# Patient Record
Sex: FEMALE | Race: WHITE | NOT HISPANIC OR LATINO | Employment: FULL TIME | URBAN - METROPOLITAN AREA
[De-identification: names, ages, dates, MRNs, and addresses within clinical notes are randomized per-mention and may not be internally consistent; named-entity substitution may affect disease eponyms.]

---

## 2017-09-16 ENCOUNTER — HOSPITAL ENCOUNTER (OUTPATIENT)
Facility: MEDICAL CENTER | Age: 42
End: 2017-09-16
Payer: COMMERCIAL

## 2017-09-16 LAB
ALBUMIN SERPL BCP-MCNC: 4.8 G/DL (ref 3.2–4.9)
ALBUMIN/GLOB SERPL: 1.7 G/DL
ALP SERPL-CCNC: 107 U/L (ref 30–99)
ALT SERPL-CCNC: 29 U/L (ref 2–50)
ANION GAP SERPL CALC-SCNC: 6 MMOL/L (ref 0–11.9)
AST SERPL-CCNC: 21 U/L (ref 12–45)
BDY FAT % MEASURED: 43.9 %
BILIRUB SERPL-MCNC: 0.5 MG/DL (ref 0.1–1.5)
BP DIAS: 80 MMHG
BP SYS: 120 MMHG
BUN SERPL-MCNC: 16 MG/DL (ref 8–22)
CALCIUM SERPL-MCNC: 10.2 MG/DL (ref 8.5–10.5)
CHLORIDE SERPL-SCNC: 104 MMOL/L (ref 96–112)
CHOLEST SERPL-MCNC: 242 MG/DL (ref 100–199)
CO2 SERPL-SCNC: 28 MMOL/L (ref 20–33)
CREAT SERPL-MCNC: 0.67 MG/DL (ref 0.5–1.4)
DIABETES HTDIA: NO
EVENT NAME HTEVT: NORMAL
GFR SERPL CREATININE-BSD FRML MDRD: >60 ML/MIN/1.73 M 2
GLOBULIN SER CALC-MCNC: 2.9 G/DL (ref 1.9–3.5)
GLUCOSE SERPL-MCNC: 82 MG/DL (ref 65–99)
HDLC SERPL-MCNC: 56 MG/DL
HYPERTENSION HTHYP: NO
LDLC SERPL CALC-MCNC: 169 MG/DL
POTASSIUM SERPL-SCNC: 4 MMOL/L (ref 3.6–5.5)
PROT SERPL-MCNC: 7.7 G/DL (ref 6–8.2)
SCREENING LOC CITY HTCIT: NORMAL
SCREENING LOC STATE HTSTA: NORMAL
SCREENING LOCATION HTLOC: NORMAL
SODIUM SERPL-SCNC: 138 MMOL/L (ref 135–145)
SUBSCRIBER ID HTSID: NORMAL
T4 FREE SERPL-MCNC: 0.87 NG/DL (ref 0.53–1.43)
TRIGL SERPL-MCNC: 83 MG/DL (ref 0–149)

## 2018-01-03 ENCOUNTER — HOSPITAL ENCOUNTER (OUTPATIENT)
Dept: RADIOLOGY | Facility: MEDICAL CENTER | Age: 43
End: 2018-01-03
Attending: NEUROLOGICAL SURGERY
Payer: COMMERCIAL

## 2018-01-03 DIAGNOSIS — D49.7 PITUITARY NEOPLASM: ICD-10-CM

## 2018-01-03 PROCEDURE — 70553 MRI BRAIN STEM W/O & W/DYE: CPT

## 2018-01-03 PROCEDURE — A9579 GAD-BASE MR CONTRAST NOS,1ML: HCPCS | Performed by: NEUROLOGICAL SURGERY

## 2018-01-03 PROCEDURE — 700117 HCHG RX CONTRAST REV CODE 255: Performed by: NEUROLOGICAL SURGERY

## 2018-01-03 RX ADMIN — GADODIAMIDE 20 ML: 287 INJECTION INTRAVENOUS at 09:54

## 2018-11-09 ENCOUNTER — HOSPITAL ENCOUNTER (OUTPATIENT)
Dept: RADIOLOGY | Facility: MEDICAL CENTER | Age: 43
End: 2018-11-09
Attending: NEUROLOGICAL SURGERY
Payer: COMMERCIAL

## 2018-11-09 DIAGNOSIS — D35.2 BENIGN NEOPLASM OF PITUITARY GLAND AND CRANIOPHARYNGEAL DUCT (POUCH) (HCC): ICD-10-CM

## 2018-11-09 DIAGNOSIS — D35.3 BENIGN NEOPLASM OF PITUITARY GLAND AND CRANIOPHARYNGEAL DUCT (POUCH) (HCC): ICD-10-CM

## 2018-11-09 PROCEDURE — 70553 MRI BRAIN STEM W/O & W/DYE: CPT

## 2018-11-09 PROCEDURE — 700117 HCHG RX CONTRAST REV CODE 255: Performed by: NEUROLOGICAL SURGERY

## 2018-11-09 PROCEDURE — A9585 GADOBUTROL INJECTION: HCPCS | Performed by: NEUROLOGICAL SURGERY

## 2018-11-09 RX ORDER — GADOBUTROL 604.72 MG/ML
10 INJECTION INTRAVENOUS ONCE
Status: COMPLETED | OUTPATIENT
Start: 2018-11-09 | End: 2018-11-09

## 2018-11-09 RX ADMIN — GADOBUTROL 10 ML: 604.72 INJECTION INTRAVENOUS at 14:30

## 2018-11-12 ENCOUNTER — HOSPITAL ENCOUNTER (OUTPATIENT)
Dept: LAB | Facility: MEDICAL CENTER | Age: 43
End: 2018-11-12
Attending: FAMILY MEDICINE
Payer: COMMERCIAL

## 2018-11-12 LAB
ALBUMIN SERPL BCP-MCNC: 4.3 G/DL (ref 3.2–4.9)
ALBUMIN/GLOB SERPL: 1.6 G/DL
ALP SERPL-CCNC: 108 U/L (ref 30–99)
ALT SERPL-CCNC: 23 U/L (ref 2–50)
ANION GAP SERPL CALC-SCNC: 8 MMOL/L (ref 0–11.9)
AST SERPL-CCNC: 18 U/L (ref 12–45)
BASOPHILS # BLD AUTO: 0.9 % (ref 0–1.8)
BASOPHILS # BLD: 0.06 K/UL (ref 0–0.12)
BILIRUB SERPL-MCNC: 0.3 MG/DL (ref 0.1–1.5)
BUN SERPL-MCNC: 10 MG/DL (ref 8–22)
CALCIUM SERPL-MCNC: 10.1 MG/DL (ref 8.5–10.5)
CHLORIDE SERPL-SCNC: 108 MMOL/L (ref 96–112)
CHOLEST SERPL-MCNC: 232 MG/DL (ref 100–199)
CO2 SERPL-SCNC: 25 MMOL/L (ref 20–33)
CREAT SERPL-MCNC: 0.72 MG/DL (ref 0.5–1.4)
EOSINOPHIL # BLD AUTO: 0.19 K/UL (ref 0–0.51)
EOSINOPHIL NFR BLD: 2.8 % (ref 0–6.9)
ERYTHROCYTE [DISTWIDTH] IN BLOOD BY AUTOMATED COUNT: 40.1 FL (ref 35.9–50)
EST. AVERAGE GLUCOSE BLD GHB EST-MCNC: 114 MG/DL
FASTING STATUS PATIENT QL REPORTED: NORMAL
GLOBULIN SER CALC-MCNC: 2.7 G/DL (ref 1.9–3.5)
GLUCOSE SERPL-MCNC: 80 MG/DL (ref 65–99)
HBA1C MFR BLD: 5.6 % (ref 0–5.6)
HCT VFR BLD AUTO: 46.3 % (ref 37–47)
HDLC SERPL-MCNC: 55 MG/DL
HGB BLD-MCNC: 14.6 G/DL (ref 12–16)
IMM GRANULOCYTES # BLD AUTO: 0.03 K/UL (ref 0–0.11)
IMM GRANULOCYTES NFR BLD AUTO: 0.4 % (ref 0–0.9)
LDLC SERPL CALC-MCNC: 158 MG/DL
LYMPHOCYTES # BLD AUTO: 1.94 K/UL (ref 1–4.8)
LYMPHOCYTES NFR BLD: 28.5 % (ref 22–41)
MCH RBC QN AUTO: 27.3 PG (ref 27–33)
MCHC RBC AUTO-ENTMCNC: 31.5 G/DL (ref 33.6–35)
MCV RBC AUTO: 86.7 FL (ref 81.4–97.8)
MONOCYTES # BLD AUTO: 0.38 K/UL (ref 0–0.85)
MONOCYTES NFR BLD AUTO: 5.6 % (ref 0–13.4)
NEUTROPHILS # BLD AUTO: 4.2 K/UL (ref 2–7.15)
NEUTROPHILS NFR BLD: 61.8 % (ref 44–72)
NRBC # BLD AUTO: 0 K/UL
NRBC BLD-RTO: 0 /100 WBC
PLATELET # BLD AUTO: 348 K/UL (ref 164–446)
PMV BLD AUTO: 10.5 FL (ref 9–12.9)
POTASSIUM SERPL-SCNC: 4.3 MMOL/L (ref 3.6–5.5)
PROT SERPL-MCNC: 7 G/DL (ref 6–8.2)
RBC # BLD AUTO: 5.34 M/UL (ref 4.2–5.4)
SODIUM SERPL-SCNC: 141 MMOL/L (ref 135–145)
T3 SERPL-MCNC: 126.8 NG/DL (ref 60–181)
T4 FREE SERPL-MCNC: 0.92 NG/DL (ref 0.53–1.43)
T4 SERPL-MCNC: 8.8 UG/DL (ref 4–12)
TRIGL SERPL-MCNC: 94 MG/DL (ref 0–149)
TSH SERPL DL<=0.005 MIU/L-ACNC: 2.11 UIU/ML (ref 0.38–5.33)
WBC # BLD AUTO: 6.8 K/UL (ref 4.8–10.8)

## 2018-11-12 PROCEDURE — 80061 LIPID PANEL: CPT

## 2018-11-12 PROCEDURE — 84439 ASSAY OF FREE THYROXINE: CPT

## 2018-11-12 PROCEDURE — 80053 COMPREHEN METABOLIC PANEL: CPT

## 2018-11-12 PROCEDURE — 84480 ASSAY TRIIODOTHYRONINE (T3): CPT

## 2018-11-12 PROCEDURE — 85025 COMPLETE CBC W/AUTO DIFF WBC: CPT

## 2018-11-12 PROCEDURE — 83036 HEMOGLOBIN GLYCOSYLATED A1C: CPT

## 2018-11-12 PROCEDURE — 84443 ASSAY THYROID STIM HORMONE: CPT

## 2018-11-12 PROCEDURE — 36415 COLL VENOUS BLD VENIPUNCTURE: CPT

## 2018-12-20 ENCOUNTER — OFFICE VISIT (OUTPATIENT)
Dept: OTHER | Facility: IMAGING CENTER | Age: 43
End: 2018-12-20
Payer: COMMERCIAL

## 2018-12-20 VITALS
SYSTOLIC BLOOD PRESSURE: 122 MMHG | HEIGHT: 67 IN | RESPIRATION RATE: 14 BRPM | OXYGEN SATURATION: 95 % | HEART RATE: 65 BPM | TEMPERATURE: 97.8 F | WEIGHT: 242.2 LBS | DIASTOLIC BLOOD PRESSURE: 78 MMHG | BODY MASS INDEX: 38.01 KG/M2

## 2018-12-20 DIAGNOSIS — R53.83 FATIGUE, UNSPECIFIED TYPE: ICD-10-CM

## 2018-12-20 DIAGNOSIS — E78.00 HYPERCHOLESTEREMIA: ICD-10-CM

## 2018-12-20 DIAGNOSIS — R74.8 ELEVATED ALKALINE PHOSPHATASE LEVEL: ICD-10-CM

## 2018-12-20 DIAGNOSIS — Z86.018 HISTORY OF PITUITARY ADENOMA: ICD-10-CM

## 2018-12-20 DIAGNOSIS — M25.542 ARTHRALGIA OF BOTH HANDS: ICD-10-CM

## 2018-12-20 DIAGNOSIS — F43.9 STRESS: ICD-10-CM

## 2018-12-20 DIAGNOSIS — M25.541 ARTHRALGIA OF BOTH HANDS: ICD-10-CM

## 2018-12-20 DIAGNOSIS — E66.9 OBESITY (BMI 30-39.9): ICD-10-CM

## 2018-12-20 PROCEDURE — 99205 OFFICE O/P NEW HI 60 MIN: CPT | Performed by: FAMILY MEDICINE

## 2018-12-20 ASSESSMENT — PATIENT HEALTH QUESTIONNAIRE - PHQ9: CLINICAL INTERPRETATION OF PHQ2 SCORE: 0

## 2018-12-21 PROBLEM — Z86.018 HISTORY OF PITUITARY ADENOMA: Status: ACTIVE | Noted: 2018-12-21

## 2018-12-21 PROBLEM — E66.9 OBESITY (BMI 30-39.9): Status: ACTIVE | Noted: 2018-12-21

## 2018-12-21 NOTE — PROGRESS NOTES
Chief Complaint   Patient presents with   • Fatigue     tired everyday   • Stress     work-related, daily   • Other     hormone issues due to pituitary tumor removal        HPI:  43 y.o. female new patient with history of pituary tumor s/p removal here for fatigue and stress. She goes by AJ.   Fatigue- has had for past few years.   Work stress- not much support. 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 Works partner is having some emotional issues as well as is seeing a counselor.   Sleeps well at night, slight snoring. Thyroid labs stable, previously on thyroid medication-currently not taking.     2013- had pituitary tumor removed, which was benign. She had developed visual tunneling and was evaluated by her eye doctor who recommended she have MRI completed.   Dr. Espinal did the surgery, but Dr. Villalobos- continues to follow. Has been stable without further issues.     Occasional joint pain in hands- mostly near right 1st CMC joint and slightly distally, but sometimes on left as well. Notices with opening jars. She is right handed. Has had on and off for past year.   No injury or other specific repetitive activity of which she is aware.       Health Maintenance  Last pap: hx of hysterectomy, has cervix and ovaries- last pap 2011. Patient recommended to schedule for routine gyn exam/pap.   Immunizations: Tdap - 2017  Mammogram: Obtain prior records.   Colonoscopy: consider start age 47 yo      Lifestyle:  Diet: coffee, lots of fruits/veggies, meat, Zeinab, Coke Zero  Supplements: red yeast rice, Co Q 10  Exercise/Activities: likes to read, travel  Stress: sleeps well overall, work stressors  Social Support:  Lives with , 2 girls.   Teaching partner with issues.     Review of Systems   Constitutional: Negative for fever, chills and malaise/fatigue.   HENT: Negative for congestion.    Eyes: Negative for pain or vision changes.  "  Respiratory: Negative for cough and shortness of breath.    Cardiovascular: Negative for leg swelling. No chest pain.   Gastrointestinal: Negative for nausea, vomiting, abdominal pain and diarrhea.   Genitourinary: Negative for dysuria and hematuria.   Skin: Negative for rash.   Neurological: Negative for dizziness, focal weakness and headaches.   Endo/Heme/Allergies: Does not bruise/bleed easily.   Psychiatric/Behavioral: Negative for depression.  The patient is not nervous/anxious.          Current Outpatient Prescriptions:   •  Red Yeast Rice Extract (RED YEAST RICE PO), Take  by mouth., Disp: , Rfl:   •  Coenzyme Q10 (COQ10 PO), Take  by mouth., Disp: , Rfl:   •  levothyroxine (SYNTHROID) 100 MCG Tab, TAKE ONE TABLET BY MOUTH ONCE DAILY (Patient not taking: Reported on 12/20/2018), Disp: 30 Tab, Rfl: 0  •  loratadine (CLARITIN) 10 MG TABS, Take 10 mg by mouth every day., Disp: , Rfl:     Allergies   Allergen Reactions   • Calloway Flavor    • Morphine Vomiting   • Red Wine Complex    • Seasonal        Past Medical History:   Diagnosis Date   • Anemia 2008    history of, prior to hysterectomy   • ASTHMA     \"exercise induced, no inhalers\"   • Indigestion    • Migraine    • 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 at SURGERY [facility]   • OTHER      breast reduction   • SUPRACERVICAL HYSTERECTOMY SCOPE      has ovaries and cervix remaining       Family History   Problem Relation Age of Onset   • Autoimmune Disease Mother         raynaud's and a few others   • GI Mother         mother had possible biliary cirrhosis   • Lung Disease Maternal Grandmother    • Diabetes Paternal Grandmother    • Heart Disease Paternal Grandfather         multiple heart attacks, " "he smoked   • Cancer Neg Hx        Social History     Social History   • Marital status:      Spouse name: N/A   • Number of children: N/A   • Years of education: N/A     Occupational History   •       Social History Main Topics   • Smoking status: Never Smoker   • Smokeless tobacco: Never Used   • Alcohol use Yes      Comment: very occasionally beer and wine    • Drug use: No   • Sexual activity: Yes     Partners: Male     Other Topics Concern   • Not on file     Social History Narrative    , 2 children.          PHYSICAL EXAM:  /78 (BP Location: Left arm, Patient Position: Sitting, BP Cuff Size: Adult)   Pulse 65   Temp 36.6 °C (97.8 °F) (Temporal)   Resp 14   Ht 1.702 m (5' 7\")   Wt 109.9 kg (242 lb 3.2 oz)   SpO2 95%   BMI 37.93 kg/m²   Constitutional: She appears well-developed and well-nourished. She appears not diaphoretic. No distress.   HENT: Right Ear: External ear normal. Left Ear: External ear normal. Tympanic membranes clear and intact.   Nose: Nose normal.   Mouth/Throat: Oropharynx is clear and moist. No oropharyngeal exudate.     Eyes: Conjunctivae and extraocular motions are normal. Pupils are equal, round, and reactive to light. No scleral icterus.   Neck: Normal range of motion. Neck supple. No thyromegaly present.   Cardiovascular: Normal rate, regular rhythm, normal heart sounds and intact distal pulses.  Exam reveals no gallop and no friction rub.  No murmur heard. No carotid bruits.   Pulmonary/Chest: Effort normal and breath sounds normal. No respiratory distress. She has no wheezes. She has no rales.   Abdominal: Soft. Bowel sounds are normal. She exhibits no distension and no mass. No tenderness. She has no rebound and no guarding.   Lymphadenopathy:  She has no cervical adenopathy.   Neurological: She is alert. She has normal reflexes. No cranial nerve deficit. She exhibits normal muscle tone.   Skin: Skin is warm and dry. No rash noted. She is " not diaphoretic. No erythema.   Psychiatric: She has a normal mood and affect. Her behavior is normal.   Musculoskeletal: She exhibits no edema. Normal strength. Mild TTP of right side 1st CMC joint region, negative Finkelstein's test, negative grind test.        Ref. Range 11/12/2018 09:28   WBC Latest Ref Range: 4.8 - 10.8 K/uL 6.8   RBC Latest Ref Range: 4.20 - 5.40 M/uL 5.34   Hemoglobin Latest Ref Range: 12.0 - 16.0 g/dL 14.6   Hematocrit Latest Ref Range: 37.0 - 47.0 % 46.3   MCV Latest Ref Range: 81.4 - 97.8 fL 86.7   MCH Latest Ref Range: 27.0 - 33.0 pg 27.3   MCHC Latest Ref Range: 33.6 - 35.0 g/dL 31.5 (L)   RDW Latest Ref Range: 35.9 - 50.0 fL 40.1   Platelet Count Latest Ref Range: 164 - 446 K/uL 348   MPV Latest Ref Range: 9.0 - 12.9 fL 10.5   Neutrophils-Polys Latest Ref Range: 44.00 - 72.00 % 61.80   Neutrophils (Absolute) Latest Ref Range: 2.00 - 7.15 K/uL 4.20   Lymphocytes Latest Ref Range: 22.00 - 41.00 % 28.50   Lymphs (Absolute) Latest Ref Range: 1.00 - 4.80 K/uL 1.94   Monocytes Latest Ref Range: 0.00 - 13.40 % 5.60   Monos (Absolute) Latest Ref Range: 0.00 - 0.85 K/uL 0.38   Eosinophils Latest Ref Range: 0.00 - 6.90 % 2.80   Eos (Absolute) Latest Ref Range: 0.00 - 0.51 K/uL 0.19   Basophils Latest Ref Range: 0.00 - 1.80 % 0.90   Baso (Absolute) Latest Ref Range: 0.00 - 0.12 K/uL 0.06   Immature Granulocytes Latest Ref Range: 0.00 - 0.90 % 0.40   Immature Granulocytes (abs) Latest Ref Range: 0.00 - 0.11 K/uL 0.03   Nucleated RBC Latest Units: /100 WBC 0.00   NRBC (Absolute) Latest Units: K/uL 0.00   Sodium Latest Ref Range: 135 - 145 mmol/L 141   Potassium Latest Ref Range: 3.6 - 5.5 mmol/L 4.3   Chloride Latest Ref Range: 96 - 112 mmol/L 108   Co2 Latest Ref Range: 20 - 33 mmol/L 25   Anion Gap Latest Ref Range: 0.0 - 11.9  8.0   Glucose Latest Ref Range: 65 - 99 mg/dL 80   Bun Latest Ref Range: 8 - 22 mg/dL 10   Creatinine Latest Ref Range: 0.50 - 1.40 mg/dL 0.72   GFR If African American  Latest Ref Range: >60 mL/min/1.73 m 2 >60   GFR If Non  Latest Ref Range: >60 mL/min/1.73 m 2 >60   Calcium Latest Ref Range: 8.5 - 10.5 mg/dL 10.1   AST(SGOT) Latest Ref Range: 12 - 45 U/L 18   ALT(SGPT) Latest Ref Range: 2 - 50 U/L 23   Alkaline Phosphatase Latest Ref Range: 30 - 99 U/L 108 (H)   Total Bilirubin Latest Ref Range: 0.1 - 1.5 mg/dL 0.3   Albumin Latest Ref Range: 3.2 - 4.9 g/dL 4.3   Total Protein Latest Ref Range: 6.0 - 8.2 g/dL 7.0   Globulin Latest Ref Range: 1.9 - 3.5 g/dL 2.7   A-G Ratio Latest Units: g/dL 1.6   Glycohemoglobin Latest Ref Range: 0.0 - 5.6 % 5.6   Estim. Avg Glu Latest Units: mg/dL 114   Fasting Status Unknown Fasting   Cholesterol,Tot Latest Ref Range: 100 - 199 mg/dL 232 (H)   Triglycerides Latest Ref Range: 0 - 149 mg/dL 94   HDL Latest Ref Range: >=40 mg/dL 55   LDL Latest Ref Range: <100 mg/dL 158 (H)   TSH Latest Ref Range: 0.380 - 5.330 uIU/mL 2.110   Thyroxine -T4 Latest Ref Range: 4.0 - 12.0 ug/dL 8.8   Free T-4 Latest Ref Range: 0.53 - 1.43 ng/dL 0.92   T3 Latest Ref Range: 60.0 - 181.0 ng/dL 126.8       ASSESSMENT/PLAN:    This is a 43 y.o. Female     1. Fatigue, unspecified type- appears multifactorial, but component due to work and stressors. Reviewed recent labs as above.   -Recommend working on self care with adequate nutrition and fluid intake, routine exercise, adequate sleep and stress management. Obtain overnight oximetry.     2. Stress - as above.   -Recommend working on routine meditation practices and consider acupuncture therapy. Recommend continue to work on diet and exercise. Counseled on management of current stressors. Recommend journaling, consider gratitude journal. Recommended reading material and apps.     3. Elevated alkaline phosphatase level   -Monitor labs.  ALKALINE PHOSPHATASE ISOENZYMES    PTH INTACT (PTH ONLY)    GAMMA GT (GGT)   4. Arthralgia of both hands- R> L, suspect DJD.   -Modify activities. Recommend anti-inflammatory  diet. Consider glucosamine chondroitin. May use wrist splint as needed.  DX-WRIST-COMPLETE 3+ RIGHT   5. History of pituitary adenoma- stable. Continue routine follow up with neurosurgeon.      6. Hypercholesteremia   -Recommend heart healthy/plant based diet. May use red yeast rice 1200 mg twice daily as tolerated.   -Recheck labs in 4-6 months.       7. Obesity (BMI 30-39.9)  Patient's body mass index is 37.93 kg/m². Exercise and nutrition counseling were performed at this visit.             Return in about 6 weeks (around 1/31/2019).      This medical record contains text that has been entered with the assistance of computer voice recognition and dictation software.  Therefore, it may contain unintended errors in text, spelling, punctuation, or grammar.     > 60 minutes face to face time spent with this patient of which > 30  minutes spent on counseling and coordination of care as above, excluding any time for procedures.

## 2018-12-27 ENCOUNTER — HOSPITAL ENCOUNTER (OUTPATIENT)
Dept: LAB | Facility: MEDICAL CENTER | Age: 43
End: 2018-12-27
Attending: FAMILY MEDICINE
Payer: COMMERCIAL

## 2018-12-27 DIAGNOSIS — R74.8 ELEVATED ALKALINE PHOSPHATASE LEVEL: ICD-10-CM

## 2018-12-27 LAB
GGT SERPL-CCNC: 41 U/L (ref 7–34)
PTH-INTACT SERPL-MCNC: 107.5 PG/ML (ref 14–72)

## 2018-12-27 PROCEDURE — 83970 ASSAY OF PARATHORMONE: CPT

## 2018-12-27 PROCEDURE — 84075 ASSAY ALKALINE PHOSPHATASE: CPT

## 2018-12-27 PROCEDURE — 84080 ASSAY ALKALINE PHOSPHATASES: CPT

## 2018-12-27 PROCEDURE — 82977 ASSAY OF GGT: CPT

## 2018-12-27 PROCEDURE — 36415 COLL VENOUS BLD VENIPUNCTURE: CPT

## 2018-12-30 LAB
ALP BONE SERPL-CCNC: 39 U/L (ref 0–55)
ALP ISOS SERPL HS-CCNC: 0 U/L
ALP LIVER SERPL-CCNC: 91 U/L (ref 0–94)
ALP SERPL-CCNC: 130 U/L (ref 40–120)

## 2019-01-24 ENCOUNTER — OFFICE VISIT (OUTPATIENT)
Dept: OTHER | Facility: IMAGING CENTER | Age: 44
End: 2019-01-24
Payer: COMMERCIAL

## 2019-01-24 ENCOUNTER — HOSPITAL ENCOUNTER (OUTPATIENT)
Dept: LAB | Facility: MEDICAL CENTER | Age: 44
End: 2019-01-24
Attending: FAMILY MEDICINE
Payer: COMMERCIAL

## 2019-01-24 VITALS
SYSTOLIC BLOOD PRESSURE: 132 MMHG | WEIGHT: 242.95 LBS | TEMPERATURE: 98.6 F | RESPIRATION RATE: 16 BRPM | HEART RATE: 72 BPM | HEIGHT: 67 IN | OXYGEN SATURATION: 97 % | DIASTOLIC BLOOD PRESSURE: 88 MMHG | BODY MASS INDEX: 38.13 KG/M2

## 2019-01-24 DIAGNOSIS — R79.89 INCREASED PTH LEVEL: ICD-10-CM

## 2019-01-24 DIAGNOSIS — E03.9 HYPOTHYROIDISM, UNSPECIFIED TYPE: ICD-10-CM

## 2019-01-24 DIAGNOSIS — R74.8 ELEVATED SERUM GGT LEVEL: ICD-10-CM

## 2019-01-24 DIAGNOSIS — R74.8 ELEVATED ALKALINE PHOSPHATASE LEVEL: ICD-10-CM

## 2019-01-24 DIAGNOSIS — Z86.018 HISTORY OF PITUITARY ADENOMA: ICD-10-CM

## 2019-01-24 DIAGNOSIS — Z12.4 SCREENING FOR CERVICAL CANCER: ICD-10-CM

## 2019-01-24 DIAGNOSIS — R53.83 FATIGUE, UNSPECIFIED TYPE: ICD-10-CM

## 2019-01-24 DIAGNOSIS — Z01.419 WELL WOMAN EXAM WITH ROUTINE GYNECOLOGICAL EXAM: ICD-10-CM

## 2019-01-24 DIAGNOSIS — Z12.39 SCREENING FOR BREAST CANCER: ICD-10-CM

## 2019-01-24 PROCEDURE — 88175 CYTOPATH C/V AUTO FLUID REDO: CPT

## 2019-01-24 PROCEDURE — 87624 HPV HI-RISK TYP POOLED RSLT: CPT

## 2019-01-24 PROCEDURE — 99396 PREV VISIT EST AGE 40-64: CPT | Performed by: FAMILY MEDICINE

## 2019-01-24 ASSESSMENT — PAIN SCALES - GENERAL: PAINLEVEL: NO PAIN

## 2019-01-24 ASSESSMENT — PATIENT HEALTH QUESTIONNAIRE - PHQ9: CLINICAL INTERPRETATION OF PHQ2 SCORE: 0

## 2019-01-25 NOTE — PROGRESS NOTES
"  Chief Complaint   Patient presents with   • Gynecologic Exam         <SUBJECTIVE>  Emiliana Her is a 43 y.o. female with history of benign pituitary tumor and hypothyroidism for routine annual gynecological evaluation.     Obstetric History       T0      L0     SAB0   TAB0   Ectopic0   Molar0   Multiple0   Live Births0        Fatigue- symptoms for past few years, stable. Has work stress.   Mildly elevated alkaline phosphatase and GGT noted.   Elevated PTH noted on recent labs.   States her mother had issues with blocked bile duct- primary biliary cholangitis, raynaud's, sjogren's, and CREST.   Some increased weight noted. Hand pain symptoms mostly right side. Cold water seems to help.     Health Maintenance  Last pap: hx of hysterecomty, has cervix and ovaries- last pap .   Immunizations: Tdap 2017  Mammogram: Obtain prior records, recommend every 1-2 years otherwise  Colonoscopy: consider start age 49 yo       Lifestyle:  Diet: varies, lots of fruits/veggies, meat, coffee, coke zero, la croix  Supplements: as listed.   Exercise/Activities: she likes to read and travel  Stress: sleeps well overall, work stressors  Social Support:  Lives with , 2 girls.   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       Current Outpatient Prescriptions   Medication Sig Dispense Refill   • Red Yeast Rice Extract (RED YEAST RICE PO) Take  by mouth.     • Coenzyme Q10 (COQ10 PO) Take  by mouth.     • loratadine (CLARITIN) 10 MG TABS Take 10 mg by mouth every day.       No current facility-administered medications for this visit.      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 at SURGERY [facility]S   • OTHER      breast reduction   • SUPRACERVICAL HYSTERECTOMY SCOPE      has ovaries and cervix remaining       Family History   Problem Relation Age of Onset   • Autoimmune Disease Mother         raynaud's and a few others   • GI Mother         mother had possible biliary cirrhosis   • Lung Disease Maternal Grandmother    • Diabetes Paternal Grandmother    • Heart Disease Paternal Grandfather         multiple heart attacks, he smoked   • Cancer Neg Hx        Social History     Social History   • Marital status:      Spouse name: N/A   • Number of children: N/A   • Years of education: N/A     Occupational History   •       Social History Main Topics   • Smoking status: Never Smoker   • Smokeless tobacco: Never Used   • Alcohol use Yes      Comment: very occasionally beer and wine    • Drug use: No   • Sexual activity: Yes     Partners: Male     Other Topics Concern   • Not on file     Social History Narrative    , 2 children.          ROS:  No fevers/chills. No TIA's or unusual headaches, no dysphagia. No prolonged cough. No dyspnea or chest pain on exertion.  No abdominal pain, change in bowel habits, black or bloody stools.  No urinary tract symptoms. On self exam, has noted no new or enlarging breast lumps, nipple discharge or breast pain. No visual changes. No unusual headaches or dizziness. Gyn: No abnormal discharge or bleeding.       <OBJECTIVE>  /88   Pulse 72   Temp 37 °C (98.6 °F)   Resp 16   Ht 1.702 m (5' 7\")   Wt 110.2 kg (242 lb 15.2 oz)   SpO2 97%   BMI 38.05 kg/m²   HEAD AND NECK:  Ears normal.  Throat, oral cavity and tongue normal.  Neck supple. No adenopathy or masses in the neck or supraclavicular regions.  No carotid bruits. No thyromegaly.  NEURO: Cranial nerves are " normal. Neck supple. DTR's normal and symmetric.  CHEST:  Clear, good air entry, no wheezes, rhonchi or rales.  HEART:  S1 and S2 normal, no murmurs, clicks, gallops or rubs. Regular rate and rhythm.  No edema.  ABDOMEN:  Soft without tenderness, guarding, mass or organomegaly. Obese. No CVA tenderness or inguinal adenopathy.  EXTREMITIES:  Extremities, reflexes and peripheral pulses are normal.  SKIN:  No rashes or suspicious skin lesions noted.  BREAST EXAM: Inspection negative. No nipple discharge or bleeding. No masses or nodularity palpable. No axillary JEFFERSON.   PELVIC EXAM: External genitalia and vagina normal. Normal appearing visible cervix. No abnormal discharge. No cervical motion tenderness. Bimanual exam without adnexal masses or tenderness to palpation.        Ref. Range 12/27/2018 09:34   Gamma Gt Latest Ref Range: 7 - 34 U/L 41 (H)   Alkaline Phosphatase Latest Ref Range: 40 - 120 U/L 130 (H)   Bone Fractions Latest Ref Range: 0 - 55 U/L 39   Liver Fractions Latest Ref Range: 0 - 94 U/L 91   Alk Phos Other Calc Latest Units: U/L 0   Pth, Intact Latest Ref Range: 14.0 - 72.0 pg/mL 107.5 (H)         ASSESSMENT/PLAN:    43 year old female for routine gynecological exam.     1. Well woman exam with routine gynecological exam   -Discussed healthy diet and routine exercise.      2. Hypothyroidism, unspecified type- 11/2018 labs stable.   -Continue current medication. Stable.     3. Fatigue, unspecified type- may be multifactorial.   -Continue to monitor. Repeat labs as below.      4. Increased PTH level   -monitor. Repeat labs.  PTH WITH CALCIUM   5. Elevated alkaline phosphatase level   -monitor. Repeat labs.   ALKALINE PHOSPHATASE ISOENZYMES   6. Elevated serum GGT level  -monitor. Repeat labs.   GAMMA GT (GGT)   7. Screening for cervical cancer  THINPREP PAP WITH HPV   8. Screening for breast cancer  MA-SCREEN MAMMO W/CAD-BILAT   9. History of pituitary adenoma         Return in about 2 months (around  3/24/2019).   Recommend routine annual well visit.

## 2019-01-28 LAB
CYTOLOGY REG CYTOL: NORMAL
HPV HR 12 DNA CVX QL NAA+PROBE: NEGATIVE
HPV16 DNA SPEC QL NAA+PROBE: NEGATIVE
HPV18 DNA SPEC QL NAA+PROBE: NEGATIVE
SPECIMEN SOURCE: NORMAL

## 2019-03-11 ENCOUNTER — HOSPITAL ENCOUNTER (OUTPATIENT)
Dept: LAB | Facility: MEDICAL CENTER | Age: 44
End: 2019-03-11
Attending: FAMILY MEDICINE
Payer: COMMERCIAL

## 2019-03-11 DIAGNOSIS — R74.8 ELEVATED SERUM GGT LEVEL: ICD-10-CM

## 2019-03-11 DIAGNOSIS — R79.89 INCREASED PTH LEVEL: ICD-10-CM

## 2019-03-11 DIAGNOSIS — R74.8 ELEVATED ALKALINE PHOSPHATASE LEVEL: ICD-10-CM

## 2019-03-11 LAB
CALCIUM SERPL-MCNC: 10.2 MG/DL (ref 8.5–10.5)
GGT SERPL-CCNC: 21 U/L (ref 7–34)
PTH-INTACT SERPL-MCNC: 124.6 PG/ML (ref 14–72)

## 2019-03-11 PROCEDURE — 84075 ASSAY ALKALINE PHOSPHATASE: CPT

## 2019-03-11 PROCEDURE — 82977 ASSAY OF GGT: CPT

## 2019-03-11 PROCEDURE — 84080 ASSAY ALKALINE PHOSPHATASES: CPT

## 2019-03-11 PROCEDURE — 36415 COLL VENOUS BLD VENIPUNCTURE: CPT

## 2019-03-11 PROCEDURE — 83970 ASSAY OF PARATHORMONE: CPT

## 2019-03-14 LAB
ALP BONE SERPL-CCNC: 40 U/L (ref 0–55)
ALP ISOS SERPL HS-CCNC: 0 U/L
ALP LIVER SERPL-CCNC: 78 U/L (ref 0–94)
ALP SERPL-CCNC: 118 U/L (ref 40–120)

## 2019-04-09 ENCOUNTER — HOSPITAL ENCOUNTER (OUTPATIENT)
Dept: LAB | Facility: MEDICAL CENTER | Age: 44
End: 2019-04-09
Attending: FAMILY MEDICINE
Payer: COMMERCIAL

## 2019-04-09 DIAGNOSIS — R79.89 INCREASED PTH LEVEL: ICD-10-CM

## 2019-04-09 LAB — 25(OH)D3 SERPL-MCNC: 9 NG/ML (ref 30–100)

## 2019-04-09 PROCEDURE — 36415 COLL VENOUS BLD VENIPUNCTURE: CPT

## 2019-04-09 PROCEDURE — 82306 VITAMIN D 25 HYDROXY: CPT

## 2019-05-10 ENCOUNTER — OFFICE VISIT (OUTPATIENT)
Dept: MEDICAL GROUP | Facility: IMAGING CENTER | Age: 44
End: 2019-05-10
Payer: COMMERCIAL

## 2019-05-10 VITALS
DIASTOLIC BLOOD PRESSURE: 90 MMHG | TEMPERATURE: 97.4 F | OXYGEN SATURATION: 95 % | HEART RATE: 76 BPM | WEIGHT: 243 LBS | RESPIRATION RATE: 12 BRPM | BODY MASS INDEX: 38.14 KG/M2 | SYSTOLIC BLOOD PRESSURE: 130 MMHG | HEIGHT: 67 IN

## 2019-05-10 DIAGNOSIS — Z86.018 HISTORY OF PITUITARY ADENOMA: ICD-10-CM

## 2019-05-10 DIAGNOSIS — J30.2 SEASONAL ALLERGIES: ICD-10-CM

## 2019-05-10 DIAGNOSIS — E55.9 VITAMIN D DEFICIENCY: ICD-10-CM

## 2019-05-10 DIAGNOSIS — Z98.890 S/P CRANIOTOMY: ICD-10-CM

## 2019-05-10 DIAGNOSIS — R53.83 FATIGUE, UNSPECIFIED TYPE: ICD-10-CM

## 2019-05-10 DIAGNOSIS — R74.8 ELEVATED ALKALINE PHOSPHATASE LEVEL: ICD-10-CM

## 2019-05-10 DIAGNOSIS — Z86.39 HISTORY OF THYROID DISORDER: ICD-10-CM

## 2019-05-10 DIAGNOSIS — R79.89 INCREASED PTH LEVEL: ICD-10-CM

## 2019-05-10 PROCEDURE — 99214 OFFICE O/P EST MOD 30 MIN: CPT | Performed by: FAMILY MEDICINE

## 2019-05-10 ASSESSMENT — PAIN SCALES - GENERAL: PAINLEVEL: NO PAIN

## 2019-05-10 NOTE — PROGRESS NOTES
"SUBJECTIVE:        Chief Complaint   Patient presents with   • Results   • Vitamin D Deficiency       HPI:     Emiliana Her is a 43 y.o. female here for follow up of lab work, elevated PTH and low vitamin D levels.   States she started the vitamin D 5000 IU about 1 week or so ago and feels her prior fatigue symptoms area bout 50% better. However, before she was able to fall asleep quickly and now feels her sleep is not as good.   No other significant symptoms at this time.   No sore throat/neck pain or issues with swallowing.     Elevated alkaline phosphatase levels normalized on recent labs.     She has been using claritin for her allergies.     Hx of thyroid issues with her prior pituitary gland issues.     ROS:  Denies any recent fevers or chills. No nausea or vomiting. No diarrhea. No chest pains or shortness of breath. No lower extremity edema.    Current Outpatient Prescriptions on File Prior to Visit   Medication Sig Dispense Refill   • Red Yeast Rice Extract (RED YEAST RICE PO) Take  by mouth.     • Coenzyme Q10 (COQ10 PO) Take  by mouth.     • loratadine (CLARITIN) 10 MG TABS Take 10 mg by mouth every day.       No current facility-administered medications on file prior to visit.        Allergies   Allergen Reactions   • Calloway Flavor    • Morphine Vomiting   • Red Wine Complex    • Seasonal        Patient Active Problem List    Diagnosis Date Noted   • History of thyroid disorder- due to pituitary tumor, off medication therapy.  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   • Other specified disorder of intestines    • Seasonal allergies              OBJECTIVE:   /90   Pulse 76   Temp 36.3 °C (97.4 °F)   Resp 12   Ht 1.702 m (5' 7\")   Wt 110.2 kg (243 lb)   SpO2 95%   BMI 38.06 kg/m²   General: Well-developed well-nourished female, no acute distress  Neck: supple, no lymphadenopathy- cervical or supraclavicular, no thyromegaly  Cardiovascular: regular rate and rhythm, no murmurs, gallops, rubs  Lungs: clear to auscultation bilaterally, no wheezes, crackles, or rhonchi  Abdomen: +bowel sounds, soft, nontender, nondistended, no rebound, no guarding, no hepatosplenomegaly  Extremities: no cyanosis, clubbing, edema  Skin: Warm and dry  Psych: appropriate mood and affect     Ref. Range 3/11/2019 15:36 4/9/2019 15:26   Calcium Latest Ref Range: 8.5 - 10.5 mg/dL 10.2    Gamma Gt Latest Ref Range: 7 - 34 U/L 21    Alkaline Phosphatase Latest Ref Range: 40 - 120 U/L 118    Bone Fractions Latest Ref Range: 0 - 55 U/L 40    Liver Fractions Latest Ref Range: 0 - 94 U/L 78    Alk Phos Other Calc Latest Units: U/L 0    25-Hydroxy   Vitamin D 25 Latest Ref Range: 30 - 100 ng/mL  9 (L)   Pth, Intact Latest Ref Range: 14.0 - 72.0 pg/mL 124.6 (H)          ASSESSMENT/PLAN:    43 y.o.female with history of pituitary adenoma and resection.       1. Increased PTH level - low vitamin D levels may be contributing. Normal calcium levels noted. Alkaline phosphatase levels normalized.   -Will monitor and obtain further imaging. Repeat labs in 6-8 weeks, sooner as needed. PTH WITH CALCIUM    US-SOFT TISSUES OF HEAD - NECK    Renal Function Panel   2. Vitamin D deficiency- recently started on supplement therapy. Monitor labs.   VITAMIN D,25 HYDROXY   3. Fatigue, unspecified type- some improvement with starting on vitamin D therapy. Will monitor.     4. History of thyroid disorder- due to " pituitary adenoma. Monitor labs.   TSH WITH REFLEX TO FT4    US-SOFT TISSUES OF HEAD - NECK   5. History of pituitary adenoma     6. S/P craniotomy     7. Elevated alkaline phosphatase level - improved labs. Monitor.     8. Seasonal allergies - stable, continue current medication.         Return in about 6 weeks (around 6/21/2019).    This medical record contains text that has been entered with the assistance of computer voice recognition and dictation software.  Therefore, it may contain unintended errors in text, spelling, punctuation, or grammar.

## 2019-06-21 ENCOUNTER — HOSPITAL ENCOUNTER (OUTPATIENT)
Dept: LAB | Facility: MEDICAL CENTER | Age: 44
End: 2019-06-21
Attending: FAMILY MEDICINE
Payer: COMMERCIAL

## 2019-06-21 DIAGNOSIS — Z86.39 HISTORY OF THYROID DISORDER: ICD-10-CM

## 2019-06-21 DIAGNOSIS — E55.9 VITAMIN D DEFICIENCY: ICD-10-CM

## 2019-06-21 DIAGNOSIS — R79.89 INCREASED PTH LEVEL: ICD-10-CM

## 2019-06-21 LAB
25(OH)D3 SERPL-MCNC: 21 NG/ML (ref 30–100)
ALBUMIN SERPL BCP-MCNC: 4.3 G/DL (ref 3.2–4.9)
BUN SERPL-MCNC: 11 MG/DL (ref 8–22)
CALCIUM SERPL-MCNC: 9.9 MG/DL (ref 8.5–10.5)
CHLORIDE SERPL-SCNC: 106 MMOL/L (ref 96–112)
CO2 SERPL-SCNC: 24 MMOL/L (ref 20–33)
CREAT SERPL-MCNC: 0.7 MG/DL (ref 0.5–1.4)
GLUCOSE SERPL-MCNC: 82 MG/DL (ref 65–99)
PHOSPHATE SERPL-MCNC: 2.4 MG/DL (ref 2.5–4.5)
POTASSIUM SERPL-SCNC: 4.1 MMOL/L (ref 3.6–5.5)
PTH-INTACT SERPL-MCNC: 99.4 PG/ML (ref 14–72)
SODIUM SERPL-SCNC: 138 MMOL/L (ref 135–145)
TSH SERPL DL<=0.005 MIU/L-ACNC: 2.68 UIU/ML (ref 0.38–5.33)

## 2019-06-21 PROCEDURE — 80069 RENAL FUNCTION PANEL: CPT

## 2019-06-21 PROCEDURE — 36415 COLL VENOUS BLD VENIPUNCTURE: CPT

## 2019-06-21 PROCEDURE — 83970 ASSAY OF PARATHORMONE: CPT

## 2019-06-21 PROCEDURE — 82306 VITAMIN D 25 HYDROXY: CPT

## 2019-06-21 PROCEDURE — 84443 ASSAY THYROID STIM HORMONE: CPT

## 2019-07-10 ENCOUNTER — OFFICE VISIT (OUTPATIENT)
Dept: MEDICAL GROUP | Facility: IMAGING CENTER | Age: 44
End: 2019-07-10
Payer: COMMERCIAL

## 2019-07-10 VITALS
HEIGHT: 67 IN | BODY MASS INDEX: 38.45 KG/M2 | SYSTOLIC BLOOD PRESSURE: 124 MMHG | OXYGEN SATURATION: 96 % | WEIGHT: 245 LBS | HEART RATE: 76 BPM | TEMPERATURE: 98.2 F | RESPIRATION RATE: 12 BRPM | DIASTOLIC BLOOD PRESSURE: 84 MMHG

## 2019-07-10 DIAGNOSIS — E55.9 VITAMIN D DEFICIENCY: ICD-10-CM

## 2019-07-10 DIAGNOSIS — E78.00 HYPERCHOLESTEREMIA: ICD-10-CM

## 2019-07-10 DIAGNOSIS — R79.89 INCREASED PTH LEVEL: ICD-10-CM

## 2019-07-10 DIAGNOSIS — R53.83 FATIGUE, UNSPECIFIED TYPE: ICD-10-CM

## 2019-07-10 PROCEDURE — 99214 OFFICE O/P EST MOD 30 MIN: CPT | Performed by: FAMILY MEDICINE

## 2019-07-10 RX ORDER — ERGOCALCIFEROL 1.25 MG/1
50000 CAPSULE ORAL
Qty: 12 CAP | Refills: 0 | Status: SHIPPED | OUTPATIENT
Start: 2019-07-10 | End: 2019-10-10 | Stop reason: SDUPTHER

## 2019-07-10 ASSESSMENT — PAIN SCALES - GENERAL: PAINLEVEL: NO PAIN

## 2019-07-10 NOTE — PROGRESS NOTES
"  SUBJECTIVE:      Chief Complaint   Patient presents with   • Vitamin D Deficiency       HPI:     Emiliana Her is a 44 y.o. female here for follow up of elevated PTH levels and low vitamin D levels.   Has been taking vitamin D supplement 5000 IU daily. Vitamin D has improved.   Had repeat labs. Held off on neck ultrasound for now.   PT levels have also improved compared to prior.   Overall feels fine, no new issues or symptoms.   Fatigue comes and goes depending on activities, but feels now it's easier to recover since starting her vitamin D.   Does eat dairy and vegetables.   Hypercholesterolemia- on red yeast rice. Will need recheck of labs in future.     ROS:  Denies any recent fevers or chills. No nausea or vomiting. No diarrhea. No chest pains or shortness of breath. No lower extremity edema.    Current Outpatient Prescriptions on File Prior to Visit   Medication Sig Dispense Refill   • Cholecalciferol (VITAMIN D3) 5000 units Tab Take  by mouth.     • Red Yeast Rice Extract (RED YEAST RICE PO) Take  by mouth.     • Coenzyme Q10 (COQ10 PO) Take  by mouth.     • loratadine (CLARITIN) 10 MG TABS Take 10 mg by mouth every day.       No current facility-administered medications on file prior to visit.        Allergies   Allergen Reactions   • Calloway Flavor    • Morphine Vomiting   • Red Wine Complex    • Seasonal        Patient Active Problem List    Diagnosis Date Noted   • History of thyroid disorder- due to pituitary tumor, off medication therapy.  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/2013   • Other specified disorder of intestines    • Seasonal allergies        OBJECTIVE:   /84   Pulse 76   Temp 36.8 °C (98.2 °F)   Resp 12   Ht 1.702 m (5' 7\")   Wt 111.1 kg (245 lb)   SpO2 96%   BMI 38.37 kg/m²   General: Well-developed well-nourished female, no acute distress  Neck: supple, no lymphadenopathy- cervical or supraclavicular, no thyromegaly  Cardiovascular: regular rate and rhythm, no murmurs, gallops, rubs  Lungs: clear to auscultation bilaterally, no wheezes, crackles, or rhonchi  Abdomen: +bowel sounds, soft, nontender, nondistended, no rebound, no guarding, no hepatosplenomegaly  Extremities: no cyanosis, clubbing, edema  Skin: Warm and dry  Psych: appropriate mood and affect     Ref. Range 3/11/2019 15:36 4/9/2019 15:26 6/21/2019 10:00   25-Hydroxy   Vitamin D 25 Latest Ref Range: 30 - 100 ng/mL  9 (L) 21 (L)   TSH Latest Ref Range: 0.380 - 5.330 uIU/mL   2.680   Pth, Intact Latest Ref Range: 14.0 - 72.0 pg/mL 124.6 (H)  99.4 (H)          Ref. Range 6/21/2019 10:00   Sodium Latest Ref Range: 135 - 145 mmol/L 138   Potassium Latest Ref Range: 3.6 - 5.5 mmol/L 4.1   Chloride Latest Ref Range: 96 - 112 mmol/L 106   Co2 Latest Ref Range: 20 - 33 mmol/L 24   Glucose Latest Ref Range: 65 - 99 mg/dL 82   Bun Latest Ref Range: 8 - 22 mg/dL 11   Creatinine Latest Ref Range: 0.50 - 1.40 mg/dL 0.70   GFR If  Latest Ref Range: >60 mL/min/1.73 m 2 >60   GFR If Non  Latest Ref Range: >60 mL/min/1.73 m 2 >60   Calcium Latest Ref Range: 8.5 - 10.5 mg/dL 9.9   Albumin Latest Ref Range: 3.2 - 4.9 g/dL 4.3   Phosphorus Latest Ref Range: 2.5 - 4.5 mg/dL 2.4 (L)         ASSESSMENT/PLAN:    44 y.o.female    1. Increased PTH level - improving. Likely associated with low vitamin D levels.   -Will continue to monitor at this time. Discussed further imaging " with ultrasound and nuclear medicine imaging as needed if no further improvements or any worsening.  PTH INTACT (PTH ONLY)   2. Vitamin D deficiency - improving.   -Will start on high dose vitamin D weekly. Recheck in 12 weeks.  VITAMIN D,25 HYDROXY  Vitamin D 50,000 IU weekly   3. Fatigue, unspecified type- improving.   -Continue to monitor- work on self care, adequate fluid intake, healthy diet and routine exercise. Continue to monitor.    4. Hypercholesteremia   -Continue current supplement therapy. Heart healthy diet. Monitor labs.   Lipid Profile    Comp Metabolic Panel     Return in about 3 months (around 10/10/2019).    This medical record contains text that has been entered with the assistance of computer voice recognition and dictation software.  Therefore, it may contain unintended errors in text, spelling, punctuation, or grammar.

## 2019-09-30 ENCOUNTER — HOSPITAL ENCOUNTER (OUTPATIENT)
Dept: LAB | Facility: MEDICAL CENTER | Age: 44
End: 2019-09-30
Attending: FAMILY MEDICINE
Payer: COMMERCIAL

## 2019-09-30 DIAGNOSIS — E78.00 HYPERCHOLESTEREMIA: ICD-10-CM

## 2019-09-30 DIAGNOSIS — R79.89 INCREASED PTH LEVEL: ICD-10-CM

## 2019-09-30 DIAGNOSIS — E55.9 VITAMIN D DEFICIENCY: ICD-10-CM

## 2019-09-30 LAB
25(OH)D3 SERPL-MCNC: 45 NG/ML (ref 30–100)
ALBUMIN SERPL BCP-MCNC: 5.1 G/DL (ref 3.2–4.9)
ALBUMIN/GLOB SERPL: 2.3 G/DL
ALP SERPL-CCNC: 110 U/L (ref 30–99)
ALT SERPL-CCNC: 19 U/L (ref 2–50)
ANION GAP SERPL CALC-SCNC: 7 MMOL/L (ref 0–11.9)
AST SERPL-CCNC: 19 U/L (ref 12–45)
BILIRUB SERPL-MCNC: 0.6 MG/DL (ref 0.1–1.5)
BUN SERPL-MCNC: 12 MG/DL (ref 8–22)
CALCIUM SERPL-MCNC: 10.3 MG/DL (ref 8.5–10.5)
CHLORIDE SERPL-SCNC: 106 MMOL/L (ref 96–112)
CHOLEST SERPL-MCNC: 225 MG/DL (ref 100–199)
CO2 SERPL-SCNC: 25 MMOL/L (ref 20–33)
CREAT SERPL-MCNC: 0.8 MG/DL (ref 0.5–1.4)
FASTING STATUS PATIENT QL REPORTED: NORMAL
GLOBULIN SER CALC-MCNC: 2.2 G/DL (ref 1.9–3.5)
GLUCOSE SERPL-MCNC: 69 MG/DL (ref 65–99)
HDLC SERPL-MCNC: 49 MG/DL
LDLC SERPL CALC-MCNC: 148 MG/DL
POTASSIUM SERPL-SCNC: 4.5 MMOL/L (ref 3.6–5.5)
PROT SERPL-MCNC: 7.3 G/DL (ref 6–8.2)
PTH-INTACT SERPL-MCNC: 82.4 PG/ML (ref 14–72)
SODIUM SERPL-SCNC: 138 MMOL/L (ref 135–145)
TRIGL SERPL-MCNC: 141 MG/DL (ref 0–149)

## 2019-09-30 PROCEDURE — 80053 COMPREHEN METABOLIC PANEL: CPT

## 2019-09-30 PROCEDURE — 36415 COLL VENOUS BLD VENIPUNCTURE: CPT

## 2019-09-30 PROCEDURE — 83970 ASSAY OF PARATHORMONE: CPT

## 2019-09-30 PROCEDURE — 80061 LIPID PANEL: CPT

## 2019-09-30 PROCEDURE — 82306 VITAMIN D 25 HYDROXY: CPT

## 2019-10-10 ENCOUNTER — OFFICE VISIT (OUTPATIENT)
Dept: MEDICAL GROUP | Facility: IMAGING CENTER | Age: 44
End: 2019-10-10
Payer: COMMERCIAL

## 2019-10-10 VITALS
SYSTOLIC BLOOD PRESSURE: 124 MMHG | HEIGHT: 67 IN | TEMPERATURE: 97.7 F | WEIGHT: 241.2 LBS | OXYGEN SATURATION: 94 % | BODY MASS INDEX: 37.86 KG/M2 | RESPIRATION RATE: 14 BRPM | DIASTOLIC BLOOD PRESSURE: 76 MMHG | HEART RATE: 84 BPM

## 2019-10-10 DIAGNOSIS — R74.8 ELEVATED ALKALINE PHOSPHATASE LEVEL: ICD-10-CM

## 2019-10-10 DIAGNOSIS — R89.9 ABNORMAL LABORATORY TEST RESULT: ICD-10-CM

## 2019-10-10 DIAGNOSIS — R79.89 INCREASED PTH LEVEL: ICD-10-CM

## 2019-10-10 DIAGNOSIS — E55.9 VITAMIN D DEFICIENCY: ICD-10-CM

## 2019-10-10 DIAGNOSIS — R09.81 NASAL CONGESTION: ICD-10-CM

## 2019-10-10 PROCEDURE — 99214 OFFICE O/P EST MOD 30 MIN: CPT | Performed by: FAMILY MEDICINE

## 2019-10-10 RX ORDER — ERGOCALCIFEROL 1.25 MG/1
50000 CAPSULE ORAL
Qty: 12 CAP | Refills: 0 | Status: SHIPPED | OUTPATIENT
Start: 2019-10-10 | End: 2020-02-12

## 2019-10-10 NOTE — PROGRESS NOTES
SUBJECTIVE:        Chief Complaint   Patient presents with   • Vitamin D Deficiency   • Results     lab results    • Congestion     x 1 week        HPI:     Emiliana Her is a 44 y.o. female here for for follow-up of lab work regarding elevated PTH and vitamin D deficiency.  Patient has been regular on her vitamin D supplements.  Has been taking 50,000 IUs weekly.  Her vitamin D levels have improved over the last several months and her PTH levels have gradually decreased.  Alkaline phosphatase levels appear overall stable.    Hypercholesterolemia-does not eat much meat but does have occasional cheese.  Does not use creamer.  Currently she is eating once a day as she is busy at school.  She did start using turmeric oil in the past month.    She does note having some congestion the past week starting with slightly sore/swollen throat last Tuesday.  Davey was improving and then yesterday noticed some congestion slight earache.  Has not noticed any significant drainage.  No significant cough symptoms currently.  No fevers or chills.  She is currently off Claritin.      ROS:  Denies any recent fevers or chills. No nausea or vomiting. No diarrhea. No chest pains or shortness of breath. No lower extremity edema.    Current Outpatient Medications on File Prior to Visit   Medication Sig Dispense Refill   • TURMERIC PO Take  by mouth.     • Coenzyme Q10 (COQ10 PO) Take  by mouth.     • loratadine (CLARITIN) 10 MG TABS Take 10 mg by mouth every day.     • Cholecalciferol (VITAMIN D3) 5000 units Tab Take  by mouth.     • Red Yeast Rice Extract (RED YEAST RICE PO) Take  by mouth.       No current facility-administered medications on file prior to visit.        Allergies   Allergen Reactions   • Calloway Flavor    • Morphine Vomiting   • Red Wine Complex    • Seasonal        Patient Active Problem List    Diagnosis Date Noted   • History of thyroid disorder- due to pituitary tumor, off medication therapy.  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/2013   • Other specified disorder of intestines    • Seasonal allergies              OBJECTIVE:   /76 (BP Location: Left arm, Patient Position: Sitting, BP Cuff Size: Large adult)   Pulse 84   Temp 36.5 °C (97.7 °F) (Temporal)   Resp 14   Ht 1.702 m (5' 7\")   Wt 109.4 kg (241 lb 3.2 oz)   SpO2 94%   BMI 37.78 kg/m²   General: Well-developed well-nourished female, no acute distress  HEENT: oropharynx clear, eyes clear, TMs clear and intact, no sinus tenderness to palpation, nose clear  Neck: supple, no lymphadenopathy- cervical or supraclavicular, no thyromegaly  Cardiovascular: regular rate and rhythm, no murmurs, gallops, rubs  Lungs: clear to auscultation bilaterally, no wheezes, crackles, or rhonchi  Abdomen: +bowel sounds, soft, nontender, nondistended, no rebound, no guarding, no hepatosplenomegaly  Extremities: no cyanosis, clubbing, edema  Skin: Warm and dry  Psych: appropriate mood and affect       Ref. Range 9/30/2019 15:13   Sodium Latest Ref Range: 135 - 145 mmol/L 138   Potassium Latest Ref Range: 3.6 - 5.5 mmol/L 4.5   Chloride Latest Ref Range: 96 - 112 mmol/L 106   Co2 Latest Ref Range: 20 - 33 mmol/L 25   Anion Gap Latest Ref Range: 0.0 - 11.9  7.0   Glucose Latest Ref Range: 65 - 99 mg/dL 69   Bun Latest Ref Range: 8 - 22 mg/dL 12   Creatinine Latest Ref Range: 0.50 - 1.40 mg/dL 0.80   GFR If  Latest Ref Range: >60 " mL/min/1.73 m 2 >60   GFR If Non  Latest Ref Range: >60 mL/min/1.73 m 2 >60   Calcium Latest Ref Range: 8.5 - 10.5 mg/dL 10.3   AST(SGOT) Latest Ref Range: 12 - 45 U/L 19   ALT(SGPT) Latest Ref Range: 2 - 50 U/L 19   Alkaline Phosphatase Latest Ref Range: 30 - 99 U/L 110 (H)   Total Bilirubin Latest Ref Range: 0.1 - 1.5 mg/dL 0.6   Albumin Latest Ref Range: 3.2 - 4.9 g/dL 5.1 (H)   Total Protein Latest Ref Range: 6.0 - 8.2 g/dL 7.3   Globulin Latest Ref Range: 1.9 - 3.5 g/dL 2.2   A-G Ratio Latest Units: g/dL 2.3   Fasting Status Unknown Fasting   Cholesterol,Tot Latest Ref Range: 100 - 199 mg/dL 225 (H)   Triglycerides Latest Ref Range: 0 - 149 mg/dL 141   HDL Latest Ref Range: >=40 mg/dL 49   LDL Latest Ref Range: <100 mg/dL 148 (H)   25-Hydroxy   Vitamin D 25 Latest Ref Range: 30 - 100 ng/mL 45   Pth, Intact Latest Ref Range: 14.0 - 72.0 pg/mL 82.4 (H)         ASSESSMENT/PLAN:    44 y.o.female with history of pituitary adenoma.    1. Increased PTH level -appears secondary due to vitamin D deficiency.  Has had normal calcium levels.  Patient has had gradual improvement with PTH levels from the improvement of vitamin D levels with supplementation.  -We will continue to monitor lab work at this time. PTH INTACT (PTH ONLY)   2. Vitamin D deficiency-patient with continued improvements with supplementation.  We will continue on 50,000 IUs weekly at this time.  vitamin D, Ergocalciferol, (DRISDOL) 81683 units Cap capsule    VITAMIN D,25 HYDROXY   3. Elevated alkaline phosphatase level -overall stable.  We will continue to monitor. ALKALINE PHOSPHATASE   4. Abnormal laboratory test result -recheck lab work. PHOSPHORUS    ALBUMIN     5. Nasal congestion- residual viral cold symptoms vs allergy component.   -Recommend use of neti pot, claritin and possible corticosteroid spray. Rest, keep well hydrated, and discussed over-the-counter medications to use for symptom relief. Consider acupuncture therapy.    Follow up if no improvement in symptoms in 1-2 weeks.       Repeat lab work prior to next follow-up.    Return in about 3 months (around 1/10/2020).    This medical record contains text that has been entered with the assistance of computer voice recognition and dictation software.  Therefore, it may contain unintended errors in text, spelling, punctuation, or grammar.

## 2019-12-27 ENCOUNTER — HOSPITAL ENCOUNTER (OUTPATIENT)
Dept: LAB | Facility: MEDICAL CENTER | Age: 44
End: 2019-12-27
Attending: FAMILY MEDICINE
Payer: COMMERCIAL

## 2019-12-27 DIAGNOSIS — R89.9 ABNORMAL LABORATORY TEST RESULT: ICD-10-CM

## 2019-12-27 DIAGNOSIS — E55.9 VITAMIN D DEFICIENCY: ICD-10-CM

## 2019-12-27 DIAGNOSIS — R79.89 INCREASED PTH LEVEL: ICD-10-CM

## 2019-12-27 DIAGNOSIS — R74.8 ELEVATED ALKALINE PHOSPHATASE LEVEL: ICD-10-CM

## 2019-12-27 LAB
25(OH)D3 SERPL-MCNC: 44 NG/ML (ref 30–100)
ALBUMIN SERPL BCP-MCNC: 4.6 G/DL (ref 3.2–4.9)
ALP SERPL-CCNC: 122 U/L (ref 30–99)
PHOSPHATE SERPL-MCNC: 3.7 MG/DL (ref 2.5–4.5)
PTH-INTACT SERPL-MCNC: 148.3 PG/ML (ref 14–72)

## 2019-12-27 PROCEDURE — 83970 ASSAY OF PARATHORMONE: CPT

## 2019-12-27 PROCEDURE — 84100 ASSAY OF PHOSPHORUS: CPT

## 2019-12-27 PROCEDURE — 82306 VITAMIN D 25 HYDROXY: CPT

## 2019-12-27 PROCEDURE — 82040 ASSAY OF SERUM ALBUMIN: CPT

## 2019-12-27 PROCEDURE — 84075 ASSAY ALKALINE PHOSPHATASE: CPT

## 2019-12-27 PROCEDURE — 36415 COLL VENOUS BLD VENIPUNCTURE: CPT

## 2020-01-02 ENCOUNTER — OFFICE VISIT (OUTPATIENT)
Dept: MEDICAL GROUP | Facility: IMAGING CENTER | Age: 45
End: 2020-01-02
Payer: COMMERCIAL

## 2020-01-02 VITALS
HEART RATE: 64 BPM | WEIGHT: 247 LBS | OXYGEN SATURATION: 97 % | RESPIRATION RATE: 14 BRPM | SYSTOLIC BLOOD PRESSURE: 116 MMHG | DIASTOLIC BLOOD PRESSURE: 74 MMHG | HEIGHT: 67 IN | TEMPERATURE: 98 F | BODY MASS INDEX: 38.77 KG/M2

## 2020-01-02 DIAGNOSIS — E55.9 VITAMIN D DEFICIENCY: ICD-10-CM

## 2020-01-02 DIAGNOSIS — R79.89 INCREASED PTH LEVEL: ICD-10-CM

## 2020-01-02 DIAGNOSIS — R74.8 ELEVATED ALKALINE PHOSPHATASE LEVEL: ICD-10-CM

## 2020-01-02 DIAGNOSIS — R10.10 UPPER ABDOMINAL PAIN: ICD-10-CM

## 2020-01-02 PROCEDURE — 99214 OFFICE O/P EST MOD 30 MIN: CPT | Performed by: FAMILY MEDICINE

## 2020-01-02 ASSESSMENT — PATIENT HEALTH QUESTIONNAIRE - PHQ9: CLINICAL INTERPRETATION OF PHQ2 SCORE: 0

## 2020-01-02 NOTE — PROGRESS NOTES
SUBJECTIVE:      Chief Complaint   Patient presents with   • Lab Results       HPI:     Emiliana Her is a 44 y.o. female with history of vitamin D deficiency and elevated parathyroid levels here for follow up of lab work.   She has been on vitamin D 50,000 units weekly.  Calcium levels have been within normal range on past labs.  Recent labs show stable levels.  PTH was previously improving with vitamin D supplement but recent labs show increased levels.  Needs a refill on her vitamin D.  Alkaline phosphatase levels also increased.  She does note some occasional symptoms in the epigastric to possibly the right backside after eating.  States she is always tired but also recently had some cold symptoms.  Currently without any fevers or chills.  No nausea or vomiting.  Denies any other unusual symptoms.      ROS:  Denies any recent fevers or chills. No nausea or vomiting. No diarrhea. No chest pains or shortness of breath. No lower extremity edema.    Current Outpatient Medications on File Prior to Visit   Medication Sig Dispense Refill   • TURMERIC PO Take  by mouth.     • vitamin D, Ergocalciferol, (DRISDOL) 32666 units Cap capsule Take 1 Cap by mouth every 7 days. 12 Cap 0   • Cholecalciferol (VITAMIN D3) 5000 units Tab Take  by mouth.     • loratadine (CLARITIN) 10 MG TABS Take 10 mg by mouth every day.     • Red Yeast Rice Extract (RED YEAST RICE PO) Take  by mouth.     • Coenzyme Q10 (COQ10 PO) Take  by mouth.       No current facility-administered medications on file prior to visit.        Allergies   Allergen Reactions   • Calloway Flavor    • Morphine Vomiting   • Red Wine Complex    • Seasonal        Patient Active Problem List    Diagnosis Date Noted   • History of thyroid disorder- due to pituitary tumor, off medication therapy.  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   • Other specified disorder of intestines    • Seasonal allergies          OBJECTIVE:   /74 (BP Location: Left arm, Patient Position: Sitting, BP Cuff Size: Adult)   Pulse 64   Temp 36.7 °C (98 °F) (Temporal)   Resp 14   Ht 1.702 m (5' 7\")   Wt 112 kg (247 lb)   SpO2 97%   BMI 38.69 kg/m²   General: Well-developed well-nourished female, no acute distress  Neck: supple, no lymphadenopathy- cervical or supraclavicular, no thyromegaly  Cardiovascular: regular rate and rhythm, no murmurs, gallops, rubs  Lungs: clear to auscultation bilaterally, no wheezes, crackles, or rhonchi  Abdomen: +bowel sounds, soft, nontender, nondistended, no rebound, no guarding, no hepatosplenomegaly, no CVA tenderness  Extremities: no cyanosis, clubbing, edema  Skin: Warm and dry  Psych: appropriate mood and affect     Ref. Range 12/27/2019 15:35   Alkaline Phosphatase Latest Ref Range: 30 - 99 U/L 122 (H)   Albumin Latest Ref Range: 3.2 - 4.9 g/dL 4.6   Phosphorus Latest Ref Range: 2.5 - 4.5 mg/dL 3.7   25-Hydroxy   Vitamin D 25 Latest Ref Range: 30 - 100 ng/mL 44   Pth, Intact Latest Ref Range: 14.0 - 72.0 pg/mL 148.3 (H)       ASSESSMENT/PLAN:    44 y.o.female with history of vitamin D deficiency and elevated parathyroid hormone.    1. Increased PTH level -thought secondary due to vitamin D deficiency.  Recent labs with increased PTH and stable vitamin D levels.  -We will plan to obtain further lab work and imaging for assessment. US-SOFT TISSUES OF HEAD - NECK    PTH WITH CALCIUM    TSH WITH REFLEX TO FT4    ALKALINE " PHOSPHATASE ISOENZYMES   2. Vitamin D deficiency-stable.  Has completed 12 weeks of vitamin D 50,000 IU weekly.  -We will hold off on further refill until lab work and imaging complete. VITAMIN 1,25 DIHYDROXY   3. Elevated alkaline phosphatase level  -We will assess with further lab work. ALKALINE PHOSPHATASE ISOENZYMES   4. Upper abdominal pain-we will further assess for possible gallbladder/liver etiology with ultrasound.  Recommend modify diet and will continue to monitor. US-RUQ     Follow-up in 1 month.    This medical record contains text that has been entered with the assistance of computer voice recognition and dictation software.  Therefore, it may contain unintended errors in text, spelling, punctuation, or grammar.

## 2020-01-13 DIAGNOSIS — R79.89 INCREASED PTH LEVEL: ICD-10-CM

## 2020-01-13 DIAGNOSIS — R10.10 UPPER ABDOMINAL PAIN: ICD-10-CM

## 2020-02-12 ENCOUNTER — OFFICE VISIT (OUTPATIENT)
Dept: MEDICAL GROUP | Facility: IMAGING CENTER | Age: 45
End: 2020-02-12
Payer: COMMERCIAL

## 2020-02-12 VITALS
TEMPERATURE: 98.5 F | HEIGHT: 67 IN | SYSTOLIC BLOOD PRESSURE: 124 MMHG | DIASTOLIC BLOOD PRESSURE: 88 MMHG | RESPIRATION RATE: 12 BRPM | OXYGEN SATURATION: 95 % | HEART RATE: 80 BPM | BODY MASS INDEX: 36.41 KG/M2 | WEIGHT: 232 LBS

## 2020-02-12 DIAGNOSIS — E66.9 OBESITY (BMI 30-39.9): ICD-10-CM

## 2020-02-12 DIAGNOSIS — E21.3 HYPERPARATHYROIDISM (HCC): ICD-10-CM

## 2020-02-12 DIAGNOSIS — K76.0 FATTY LIVER: ICD-10-CM

## 2020-02-12 DIAGNOSIS — E78.00 HYPERCHOLESTEREMIA: ICD-10-CM

## 2020-02-12 DIAGNOSIS — K80.20 CALCULUS OF GALLBLADDER WITHOUT CHOLECYSTITIS WITHOUT OBSTRUCTION: ICD-10-CM

## 2020-02-12 DIAGNOSIS — R10.10 UPPER ABDOMINAL PAIN: ICD-10-CM

## 2020-02-12 DIAGNOSIS — Z86.018 HISTORY OF PITUITARY ADENOMA: ICD-10-CM

## 2020-02-12 DIAGNOSIS — E04.2 MULTIPLE THYROID NODULES: ICD-10-CM

## 2020-02-12 DIAGNOSIS — E55.9 VITAMIN D DEFICIENCY: ICD-10-CM

## 2020-02-12 DIAGNOSIS — Z86.39 HISTORY OF THYROID DISORDER: ICD-10-CM

## 2020-02-12 DIAGNOSIS — R74.8 ELEVATED ALKALINE PHOSPHATASE LEVEL: ICD-10-CM

## 2020-02-12 LAB
APPEARANCE UR: CLEAR
BILIRUB UR STRIP-MCNC: NEGATIVE MG/DL
COLOR UR AUTO: YELLOW
GLUCOSE UR STRIP.AUTO-MCNC: NEGATIVE MG/DL
KETONES UR STRIP.AUTO-MCNC: NEGATIVE MG/DL
LEUKOCYTE ESTERASE UR QL STRIP.AUTO: NEGATIVE
NITRITE UR QL STRIP.AUTO: NEGATIVE
PH UR STRIP.AUTO: 6 [PH] (ref 5–8)
PROT UR QL STRIP: NEGATIVE MG/DL
RBC UR QL AUTO: NEGATIVE
SP GR UR STRIP.AUTO: 1.01
UROBILINOGEN UR STRIP-MCNC: 0.2 MG/DL

## 2020-02-12 PROCEDURE — 81002 URINALYSIS NONAUTO W/O SCOPE: CPT | Performed by: FAMILY MEDICINE

## 2020-02-12 PROCEDURE — 99214 OFFICE O/P EST MOD 30 MIN: CPT | Performed by: FAMILY MEDICINE

## 2020-02-12 SDOH — HEALTH STABILITY: MENTAL HEALTH: HOW OFTEN DO YOU HAVE A DRINK CONTAINING ALCOHOL?: MONTHLY OR LESS

## 2020-02-12 SDOH — HEALTH STABILITY: MENTAL HEALTH: HOW MANY STANDARD DRINKS CONTAINING ALCOHOL DO YOU HAVE ON A TYPICAL DAY?: 1 OR 2

## 2020-02-12 SDOH — HEALTH STABILITY: MENTAL HEALTH: HOW OFTEN DO YOU HAVE 6 OR MORE DRINKS ON ONE OCCASION?: NEVER

## 2020-02-12 ASSESSMENT — PAIN SCALES - GENERAL: PAINLEVEL: NO PAIN

## 2020-02-12 NOTE — PROGRESS NOTES
SUBJECTIVE:      Chief Complaint   Patient presents with   • Referral Needed     endocrinology       HPI:     Emiliana Her is a 44 y.o. female with hx of pituitary adenoma and hypercholesterolemia here for follow up of elevate PTH levels.   Previously noted to have significantly low vitamin D levels. PTH appeared to be improving with replacement of vitamin D, but recent PTH levels increased with stable vitamin D levels. She is without significant symptoms.   States she eats once a day as she started an intermittent fasting diet about 3 weeks ago.   Cholelithiasis on abdominal ultrasound- notices pain on right side more so lower back region. Notices symptoms when she is not drinking water. Had abdominal ultrasound completed.   Not interested in surgical referral at this time.   In past, only drank alcohol occasionally.   Findings consistent with fatty liver on ultrasound.   Elevated cholesterol, was on red yeast rice in past.   Hx of pituitary tumor.   Hx of elevated alkaline phosphatase levels.       ROS:  Denies any recent fevers or chills. No nausea or vomiting. No diarrhea. No chest pains or shortness of breath. No lower extremity edema.    Current Outpatient Medications on File Prior to Visit   Medication Sig Dispense Refill   • Multiple Vitamins-Minerals (MULTIVITAMIN ADULT PO) Take  by mouth.     • loratadine (CLARITIN) 10 MG TABS Take 10 mg by mouth every day.       No current facility-administered medications on file prior to visit.        Allergies   Allergen Reactions   • Calloway Flavor    • Morphine Vomiting   • Red Wine Complex    • Seasonal        Patient Active Problem List    Diagnosis Date Noted   • Upper abdominal pain 02/24/2020   • Fatty liver 02/24/2020   • Calculus of gallbladder without cholecystitis without obstruction 02/24/2020   • Hypercholesteremia 02/24/2020   • Multiple thyroid nodules- 2 nodules on left <1 cm.  02/24/2020   • Hyperparathyroidism (HCC) 02/24/2020   • History of  "thyroid disorder- due to pituitary tumor, off medication therapy.  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   • Other specified disorder of intestines    • Seasonal allergies          OBJECTIVE:   /88   Pulse 80   Temp 36.9 °C (98.5 °F)   Resp 12   Ht 1.702 m (5' 7\")   Wt 105.2 kg (232 lb)   SpO2 95%   BMI 36.34 kg/m²   General: Well-developed well-nourished female, no acute distress  Neck: supple, no lymphadenopathy- cervical or supraclavicular, no thyromegaly  Cardiovascular: regular rate and rhythm, no murmurs, gallops, rubs  Lungs: clear to auscultation bilaterally, no wheezes, crackles, or rhonchi  Abdomen: +bowel sounds, soft, nontender, nondistended, no rebound, no guarding, no hepatosplenomegaly  Extremities: no cyanosis, clubbing, edema  Skin: Warm and dry  Psych: appropriate mood and affect    Scanned to media:   1/11/20- abdominal ultrasound- cholelithiasis and findings consistent with fatty liver noted.   1/11/20- neck ultrasound- left thyroid nodules < 1 cm noted. No parathyroid adenomas.      Ref. Range 9/30/2019 15:13 12/27/2019 15:35   Sodium Latest Ref Range: 135 - 145 mmol/L 138    Potassium Latest Ref Range: 3.6 - 5.5 mmol/L 4.5    Chloride Latest Ref Range: 96 - 112 mmol/L 106    Co2 Latest Ref Range: 20 - 33 mmol/L 25    Anion Gap Latest Ref Range: 0.0 - 11.9  7.0    Glucose Latest Ref Range: 65 - 99 mg/dL 69    Bun Latest Ref Range: 8 - 22 mg/dL 12  "   Creatinine Latest Ref Range: 0.50 - 1.40 mg/dL 0.80    GFR If  Latest Ref Range: >60 mL/min/1.73 m 2 >60    GFR If Non  Latest Ref Range: >60 mL/min/1.73 m 2 >60    Calcium Latest Ref Range: 8.5 - 10.5 mg/dL 10.3    AST(SGOT) Latest Ref Range: 12 - 45 U/L 19    ALT(SGPT) Latest Ref Range: 2 - 50 U/L 19    Alkaline Phosphatase Latest Ref Range: 30 - 99 U/L 110 (H) 122 (H)   Total Bilirubin Latest Ref Range: 0.1 - 1.5 mg/dL 0.6    Albumin Latest Ref Range: 3.2 - 4.9 g/dL 5.1 (H) 4.6   Total Protein Latest Ref Range: 6.0 - 8.2 g/dL 7.3    Globulin Latest Ref Range: 1.9 - 3.5 g/dL 2.2    A-G Ratio Latest Units: g/dL 2.3    Phosphorus Latest Ref Range: 2.5 - 4.5 mg/dL  3.7   Fasting Status Unknown Fasting    Cholesterol,Tot Latest Ref Range: 100 - 199 mg/dL 225 (H)    Triglycerides Latest Ref Range: 0 - 149 mg/dL 141    HDL Latest Ref Range: >=40 mg/dL 49    LDL Latest Ref Range: <100 mg/dL 148 (H)    25-Hydroxy   Vitamin D 25 Latest Ref Range: 30 - 100 ng/mL 45 44   Pth, Intact Latest Ref Range: 14.0 - 72.0 pg/mL 82.4 (H) 148.3 (H)      Ref. Range 3/11/2019 15:36 4/9/2019 15:26 6/21/2019 10:00 9/30/2019 15:13 12/27/2019 15:35   25-Hydroxy   Vitamin D 25 Latest Ref Range: 30 - 100 ng/mL  9 (L) 21 (L) 45 44   TSH Latest Ref Range: 0.380 - 5.330 uIU/mL   2.680     Pth, Intact Latest Ref Range: 14.0 - 72.0 pg/mL 124.6 (H)  99.4 (H) 82.4 (H) 148.3 (H)       ASSESSMENT/PLAN:    44 y.o.female with hx of pituitary adenoma and hypercholesterolemia.    1. Hyperparathyroidism (HCC)- initially suspected to be secondary to vitamin D deficiency. Appeared to improved with replacement of vitamin D, but had recent elevated PTH although vitamin D stable. Thus, unclear etiology.   -Will refer to endocrinology at this time. Consider nuclear medicine scan, dexa scan and repeat alkaline phophatase isoenzymes. Monitor.  REFERRAL TO ENDOCRINOLOGY    POCT Urinalysis   2. Vitamin D deficiency - improved levels,  stable.   -Recommend daily dose of 2000 IU daily.     3. Multiple thyroid nodules- 2 nodules on left <1 cm- plan to monitor with routine imaging in 1 year.      1/2020 imaging- last ultrasound.    4. Hypercholesteremia  -Counseled on low cholesterol/high fiber diet and routine exercise. Restart red yeast rice therapy, otherwise may consider statin therapy.  Monitor.     5. History of pituitary adenoma     6. History of thyroid disorder     7. Upper abdominal pain- right side back pain. Suspect component due to gallstones.   -Modify diet. Avoid aggravating factors. Monitor. Declined referral to general surgeon.      8. Fatty liver   -Recommend heart healthy diet, red yeast rice, routine exercise, avoid alcohol and weight loss. Will monitor.     9. Calculus of gallbladder without cholecystitis without obstruction - declined surgical referral.   -Recommend low fat diet. Monitor. Follow up as needed.     10. Elevated alkaline phosphatase level -likely associated with elevated PTH. Monitor.     11. Obesity (BMI 30-39.9)   Patient's body mass index is 36.34 kg/m². Exercise and nutrition counseling were performed at this visit.        Follow up after seeing endocrinology, sooner as needed.     This medical record contains text that has been entered with the assistance of computer voice recognition and dictation software.  Therefore, it may contain unintended errors in text, spelling, punctuation, or grammar.

## 2020-02-24 PROBLEM — E04.2 MULTIPLE THYROID NODULES: Status: ACTIVE | Noted: 2020-02-24

## 2020-02-24 PROBLEM — E21.3 HYPERPARATHYROIDISM (HCC): Status: ACTIVE | Noted: 2020-02-24

## 2020-02-24 PROBLEM — R10.10 UPPER ABDOMINAL PAIN: Status: ACTIVE | Noted: 2020-02-24

## 2020-02-24 PROBLEM — K76.0 FATTY LIVER: Status: ACTIVE | Noted: 2020-02-24

## 2020-02-24 PROBLEM — E78.00 HYPERCHOLESTEREMIA: Status: ACTIVE | Noted: 2020-02-24

## 2020-02-24 PROBLEM — K80.20 CALCULUS OF GALLBLADDER WITHOUT CHOLECYSTITIS WITHOUT OBSTRUCTION: Status: ACTIVE | Noted: 2020-02-24

## 2020-02-25 ENCOUNTER — TELEPHONE (OUTPATIENT)
Dept: MEDICAL GROUP | Facility: IMAGING CENTER | Age: 45
End: 2020-02-25

## 2020-02-25 NOTE — TELEPHONE ENCOUNTER
----- Message from Mirta Day M.D. sent at 2/24/2020  8:36 PM PST -----  Please notify referral center- pt would like Dr. Mendoza or Dr. Nolan.

## 2020-05-06 DIAGNOSIS — E21.3 HYPERPARATHYROIDISM (HCC): ICD-10-CM

## 2020-05-07 DIAGNOSIS — E21.3 HYPERPARATHYROIDISM (HCC): ICD-10-CM

## 2020-05-08 ENCOUNTER — HOSPITAL ENCOUNTER (OUTPATIENT)
Dept: LAB | Facility: MEDICAL CENTER | Age: 45
End: 2020-05-08
Attending: FAMILY MEDICINE
Payer: COMMERCIAL

## 2020-05-08 DIAGNOSIS — E21.3 HYPERPARATHYROIDISM (HCC): ICD-10-CM

## 2020-05-08 DIAGNOSIS — R74.8 ELEVATED ALKALINE PHOSPHATASE LEVEL: ICD-10-CM

## 2020-05-08 DIAGNOSIS — R79.89 INCREASED PTH LEVEL: ICD-10-CM

## 2020-05-08 DIAGNOSIS — E55.9 VITAMIN D DEFICIENCY: ICD-10-CM

## 2020-05-08 LAB
ALBUMIN SERPL BCP-MCNC: 4.4 G/DL (ref 3.2–4.9)
ALBUMIN/GLOB SERPL: 1.4 G/DL
ALP SERPL-CCNC: 133 U/L (ref 30–99)
ALT SERPL-CCNC: 25 U/L (ref 2–50)
ANION GAP SERPL CALC-SCNC: 14 MMOL/L (ref 7–16)
AST SERPL-CCNC: 19 U/L (ref 12–45)
BILIRUB SERPL-MCNC: 0.3 MG/DL (ref 0.1–1.5)
BUN SERPL-MCNC: 15 MG/DL (ref 8–22)
CALCIUM SERPL-MCNC: 10.4 MG/DL (ref 8.5–10.5)
CHLORIDE SERPL-SCNC: 103 MMOL/L (ref 96–112)
CO2 SERPL-SCNC: 23 MMOL/L (ref 20–33)
CREAT SERPL-MCNC: 0.78 MG/DL (ref 0.5–1.4)
FASTING STATUS PATIENT QL REPORTED: NORMAL
GLOBULIN SER CALC-MCNC: 3.1 G/DL (ref 1.9–3.5)
GLUCOSE SERPL-MCNC: 84 MG/DL (ref 65–99)
POTASSIUM SERPL-SCNC: 4.5 MMOL/L (ref 3.6–5.5)
PROT SERPL-MCNC: 7.5 G/DL (ref 6–8.2)
PTH-INTACT SERPL-MCNC: 92.6 PG/ML (ref 14–72)
SODIUM SERPL-SCNC: 140 MMOL/L (ref 135–145)
TSH SERPL DL<=0.005 MIU/L-ACNC: 2.9 UIU/ML (ref 0.38–5.33)

## 2020-05-08 PROCEDURE — 36415 COLL VENOUS BLD VENIPUNCTURE: CPT

## 2020-05-08 PROCEDURE — 83970 ASSAY OF PARATHORMONE: CPT

## 2020-05-08 PROCEDURE — 84080 ASSAY ALKALINE PHOSPHATASES: CPT

## 2020-05-08 PROCEDURE — 82652 VIT D 1 25-DIHYDROXY: CPT

## 2020-05-08 PROCEDURE — 84075 ASSAY ALKALINE PHOSPHATASE: CPT

## 2020-05-08 PROCEDURE — 84443 ASSAY THYROID STIM HORMONE: CPT

## 2020-05-08 PROCEDURE — 80053 COMPREHEN METABOLIC PANEL: CPT

## 2020-05-10 LAB — 1,25(OH)2D3 SERPL-MCNC: 85.2 PG/ML (ref 19.9–79.3)

## 2020-05-12 LAB
ALP BONE SERPL-CCNC: 47 U/L (ref 0–55)
ALP ISOS SERPL HS-CCNC: 0 U/L
ALP LIVER SERPL-CCNC: 91 U/L (ref 0–94)
ALP SERPL-CCNC: 138 U/L (ref 40–120)

## 2020-05-13 ENCOUNTER — OFFICE VISIT (OUTPATIENT)
Dept: MEDICAL GROUP | Facility: IMAGING CENTER | Age: 45
End: 2020-05-13
Payer: COMMERCIAL

## 2020-05-13 VITALS
SYSTOLIC BLOOD PRESSURE: 122 MMHG | OXYGEN SATURATION: 96 % | HEART RATE: 76 BPM | DIASTOLIC BLOOD PRESSURE: 72 MMHG | BODY MASS INDEX: 38.14 KG/M2 | TEMPERATURE: 97.7 F | WEIGHT: 243 LBS | HEIGHT: 67 IN

## 2020-05-13 DIAGNOSIS — E21.3 HYPERPARATHYROIDISM (HCC): ICD-10-CM

## 2020-05-13 DIAGNOSIS — Z86.018 HISTORY OF PITUITARY ADENOMA: ICD-10-CM

## 2020-05-13 DIAGNOSIS — E55.9 VITAMIN D DEFICIENCY: ICD-10-CM

## 2020-05-13 DIAGNOSIS — R74.8 ALKALINE PHOSPHATASE ELEVATION: ICD-10-CM

## 2020-05-13 DIAGNOSIS — J30.2 SEASONAL ALLERGIES: ICD-10-CM

## 2020-05-13 PROCEDURE — 99214 OFFICE O/P EST MOD 30 MIN: CPT | Performed by: FAMILY MEDICINE

## 2020-05-13 SDOH — HEALTH STABILITY: MENTAL HEALTH: HOW OFTEN DO YOU HAVE A DRINK CONTAINING ALCOHOL?: 2-4 TIMES A MONTH

## 2020-05-13 ASSESSMENT — FIBROSIS 4 INDEX: FIB4 SCORE: 0.5

## 2020-05-13 ASSESSMENT — PAIN SCALES - GENERAL: PAINLEVEL: NO PAIN

## 2020-05-13 NOTE — PROGRESS NOTES
SUBJECTIVE:    Chief Complaint   Patient presents with   • Lab Results   • Parathyroid Disorder       HPI:     Emiliana Her is a 44 y.o. female with history of pituitary adenoma s/p resection, hypercholesterolemia, and fatty liver here for follow up of lab work.   Elevated PTH with normal calcium levels- initially noted to have significantly low vitamin D levels at 9. She is otherwise without significant symptoms.   She had some improvement on vitamin D high dose supplements, PTH was improving, then seemed to increase again.   Currently only taking multivitamin. States she is back to feeling tired, felt better on high dose vitamin D.   Elevated alkaline phosphatase- initially mildly elevated, but currently appears further elevated on most recent labs.   No twitching/cramping. No unusual headaches or visual changes. No weakness.   No issues swallowing, but slight tightness in her neck. 1/2020- had neck soft tissue u/s-thyroid nodule noted, normal parathyroid gland noted.   UA negative.   1/2020- normal kidneys on u/s.   Calcium- normal.     She now has an appointment with endocrinology, Dr. Nolan, on July 11th.   States she was unable to see Dr. Mendoza, but now no longer accepts her insurance. States she was delayed in scheduling with endocrinology as her daughter had surgery and thus was taking of her.     Allergies-on claritin 10 mg dailly. Stable.       ROS:  Denies any recent fevers or chills. No nausea or vomiting. No diarrhea/constipation. No chest pains or shortness of breath. No lower extremity edema.    Current Outpatient Medications on File Prior to Visit   Medication Sig Dispense Refill   • Multiple Vitamins-Minerals (MULTIVITAMIN ADULT PO) Take  by mouth.     • loratadine (CLARITIN) 10 MG TABS Take 10 mg by mouth every day.       No current facility-administered medications on file prior to visit.        Allergies   Allergen Reactions   • [place] Flavor    • Morphine Vomiting   • Red Wine  "Complex    • Seasonal        Patient Active Problem List    Diagnosis Date Noted   • Upper abdominal pain 02/24/2020   • Fatty liver 02/24/2020   • Calculus of gallbladder without cholecystitis without obstruction 02/24/2020   • Hypercholesteremia 02/24/2020   • Multiple thyroid nodules- 2 nodules on left <1 cm.  02/24/2020   • Hyperparathyroidism (HCC) 02/24/2020   • History of thyroid disorder- due to pituitary tumor, off medication therapy.  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   • Other specified disorder of intestines    • Seasonal allergies        OBJECTIVE:   /72   Pulse 76   Temp 36.5 °C (97.7 °F)   Ht 1.702 m (5' 7\")   Wt 110.2 kg (243 lb)   SpO2 96%   BMI 38.06 kg/m²   General: Well-developed well-nourished female, no acute distress  Neck: supple, no lymphadenopathy- cervical or supraclavicular, no thyromegaly  Cardiovascular: regular rate and rhythm, no murmurs, gallops, rubs  Lungs: clear to auscultation bilaterally, no wheezes, crackles, or rhonchi  Abdomen: +bowel sounds, soft, nontender, nondistended, no rebound, no guarding, no hepatosplenomegaly  Extremities: no cyanosis, clubbing, edema  Skin: Warm and dry  Psych: appropriate mood and affect       Ref. Range 5/8/2020 08:07   Sodium Latest Ref Range: 135 - 145 mmol/L 140   Potassium Latest Ref Range: 3.6 - 5.5 mmol/L 4.5   Chloride Latest Ref Range: 96 - 112 mmol/L 103   Co2 Latest Ref Range: 20 - 33 " mmol/L 23   Anion Gap Latest Ref Range: 7.0 - 16.0  14.0   Glucose Latest Ref Range: 65 - 99 mg/dL 84   Bun Latest Ref Range: 8 - 22 mg/dL 15   Creatinine Latest Ref Range: 0.50 - 1.40 mg/dL 0.78   GFR If  Latest Ref Range: >60 mL/min/1.73 m 2 >60   GFR If Non  Latest Ref Range: >60 mL/min/1.73 m 2 >60   Calcium Latest Ref Range: 8.5 - 10.5 mg/dL 10.4   AST(SGOT) Latest Ref Range: 12 - 45 U/L 19   ALT(SGPT) Latest Ref Range: 2 - 50 U/L 25   Alkaline Phosphatase Latest Ref Range: 30 - 99 U/L 133 (H)   Total Bilirubin Latest Ref Range: 0.1 - 1.5 mg/dL 0.3   Albumin Latest Ref Range: 3.2 - 4.9 g/dL 4.4   Total Protein Latest Ref Range: 6.0 - 8.2 g/dL 7.5   Globulin Latest Ref Range: 1.9 - 3.5 g/dL 3.1   A-G Ratio Latest Units: g/dL 1.4   Fasting Status Unknown Fasting   Alkaline Phosphatase Latest Ref Range: 40 - 120 U/L 138 (H)   Bone Fractions Latest Ref Range: 0 - 55 U/L 47   Liver Fractions Latest Ref Range: 0 - 94 U/L 91   Alk Phos Other Calc Latest Units: U/L 0   Vitamin D-1, 25-Dihydroxy Latest Ref Range: 19.9 - 79.3 pg/mL 85.2 (H)   TSH Latest Ref Range: 0.380 - 5.330 uIU/mL 2.900   Pth, Intact Latest Ref Range: 14.0 - 72.0 pg/mL 92.6 (H)       ASSESSMENT/PLAN:    44 y.o. female with history of pituitary adenoma s/p resection, hypercholesterolemia, and fatty liver.    1. Hyperparathyroidism (HCC) - initially found to have significantly low vitamin D levels, calcium levels within normal range. PTH was improving on high dose vitamin D, but once off high dose therapy and with vitamin D levels around 40's, PTH started to increase again. Soft tissue neck u/s- did not show any parathyroid abnormality. Reports fatigue- had improvement with vitamin D supplement. No other significant symptoms. Patient was delayed in scheduling with endocrinology as she was caring for her daughter who had surgery and due to some insurance issues. Reviewed recent labs.   -Patient will plan to see  endocrinology as scheduled. Patient to notify office if any new symptoms occur.  Discussed consider dexa, urine labs and nuclear imaging study for further evaluation.     2. Vitamin D deficiency- as above. States currently on multivitamin.   -Will recheck labs.   VITAMIN D,25 HYDROXY   3. History of pituitary adenoma - hx of resection.     4. Alkaline phosphatase elevation - may be due to increase in PTH.   -Monitor. Follow up with endocrinology as scheduled.     5. Seasonal allergies -stable, OTC medication as needed. Consider use of neti pot as needed.      Patient will plan to follow up with endocrinology at this time.   Recommend routine follow up after seeing endocrinology.     This medical record contains text that has been entered with the assistance of computer voice recognition and dictation software.  Therefore, it may contain unintended errors in text, spelling, punctuation, or grammar.

## 2020-06-16 DIAGNOSIS — E21.3 HYPERPARATHYROIDISM (HCC): ICD-10-CM

## 2020-06-18 ENCOUNTER — HOSPITAL ENCOUNTER (OUTPATIENT)
Dept: LAB | Facility: MEDICAL CENTER | Age: 45
End: 2020-06-18
Attending: FAMILY MEDICINE
Payer: COMMERCIAL

## 2020-06-18 DIAGNOSIS — E55.9 VITAMIN D DEFICIENCY: ICD-10-CM

## 2020-06-18 LAB — 25(OH)D3 SERPL-MCNC: 24 NG/ML (ref 30–100)

## 2020-06-18 PROCEDURE — 36415 COLL VENOUS BLD VENIPUNCTURE: CPT

## 2020-06-18 PROCEDURE — 82306 VITAMIN D 25 HYDROXY: CPT

## 2020-06-19 ENCOUNTER — HOSPITAL ENCOUNTER (OUTPATIENT)
Facility: MEDICAL CENTER | Age: 45
End: 2020-06-19
Attending: FAMILY MEDICINE
Payer: COMMERCIAL

## 2020-06-19 DIAGNOSIS — E21.3 HYPERPARATHYROIDISM (HCC): ICD-10-CM

## 2020-06-19 PROCEDURE — 82340 ASSAY OF CALCIUM IN URINE: CPT

## 2020-06-22 LAB
CALCIUM 24H UR-MCNC: 24.9 MG/DL
CALCIUM 24H UR-MRATE: 423 MG/D
CALCIUM/CREAT 24H UR: 274 MG/G (ref 20–300)
COLLECT DURATION TIME SPEC: 24 HRS
CREAT 24H UR-MCNC: 91 MG/DL
CREAT 24H UR-MRATE: 1547 MG/D (ref 700–1600)
SPECIMEN VOL ?TM UR: 1700 ML

## 2020-06-25 ENCOUNTER — PATIENT MESSAGE (OUTPATIENT)
Dept: MEDICAL GROUP | Facility: IMAGING CENTER | Age: 45
End: 2020-06-25

## 2020-06-26 NOTE — PATIENT COMMUNICATION
Spoke with pt over phone regarding results.   Elevated urine calcium and decreased vitamin D.   Will hold off on restarting vitamin D therapy at this time until further evaluation with endocrinology- 7/11/20 visit scheduled.

## 2020-07-06 ENCOUNTER — HOSPITAL ENCOUNTER (OUTPATIENT)
Dept: RADIOLOGY | Facility: MEDICAL CENTER | Age: 45
End: 2020-07-06
Attending: FAMILY MEDICINE
Payer: COMMERCIAL

## 2020-07-06 DIAGNOSIS — E21.3 HYPERPARATHYROIDISM (HCC): ICD-10-CM

## 2020-07-06 PROCEDURE — 77080 DXA BONE DENSITY AXIAL: CPT

## 2020-07-14 ENCOUNTER — OFFICE VISIT (OUTPATIENT)
Dept: ENDOCRINOLOGY | Facility: MEDICAL CENTER | Age: 45
End: 2020-07-14
Attending: INTERNAL MEDICINE
Payer: COMMERCIAL

## 2020-07-14 ENCOUNTER — APPOINTMENT (OUTPATIENT)
Dept: LAB | Facility: MEDICAL CENTER | Age: 45
End: 2020-07-14
Attending: INTERNAL MEDICINE
Payer: COMMERCIAL

## 2020-07-14 VITALS
DIASTOLIC BLOOD PRESSURE: 82 MMHG | HEART RATE: 95 BPM | WEIGHT: 244.1 LBS | OXYGEN SATURATION: 88 % | SYSTOLIC BLOOD PRESSURE: 126 MMHG | HEIGHT: 67 IN | BODY MASS INDEX: 38.31 KG/M2

## 2020-07-14 DIAGNOSIS — E21.0 PRIMARY HYPERPARATHYROIDISM (HCC): ICD-10-CM

## 2020-07-14 DIAGNOSIS — E83.52 HYPERCALCEMIA: ICD-10-CM

## 2020-07-14 DIAGNOSIS — E04.2 MULTIPLE THYROID NODULES: ICD-10-CM

## 2020-07-14 DIAGNOSIS — R74.8 ALKALINE PHOSPHATASE ELEVATION: ICD-10-CM

## 2020-07-14 DIAGNOSIS — Z86.018 HISTORY OF PITUITARY ADENOMA: ICD-10-CM

## 2020-07-14 PROCEDURE — 99214 OFFICE O/P EST MOD 30 MIN: CPT | Performed by: INTERNAL MEDICINE

## 2020-07-14 RX ORDER — CHLORAL HYDRATE 500 MG
1000 CAPSULE ORAL
COMMUNITY

## 2020-07-14 ASSESSMENT — FIBROSIS 4 INDEX: FIB4 SCORE: 0.51

## 2020-07-15 NOTE — PROGRESS NOTES
Chief Complaint: Consult requested by Mirta Day M.D. for evaluation of Hypercalcemia    HPI:     Emiliana Her is a 45 y.o. female with here for initial evaluation of hypercalcemia.  She was first diagnosed with hypercalcemia ( high normal calcium with elevated PTH)  in September 2019.      She was discovered to have elevated alkaline phosphatase levels with fractionation showing normal bone and liver isoenzymes, this led to the work-up by her PCP and measurement of her PTH levels and other labs        Work-up by her PCP showed a high normal calcium of 10.4 with an elevated PTH of 96 on May 2020  Her dihydroxy vitamin D level was elevated and vitamin D levels were low at 24  TSH was normal at 2.90, with no free T4 levels on May 2020  Her 24 urine calcium was elevated 423 on June 19, 2020      She had an outside facility ultrasound of her abdomen on 2020 at [facility] which showed a heterogenous liver with probable fatty liver disease and a 1.9 cm gallstone with no evidence of gallbladder inflammation  Ultrasound of her kidneys showed a normal size and was negative for any nephrolithiasis or hydronephrosis      She had an outside facility thyroid ultrasound on 2020 at [facility] which showed two ipsilateral  less than 1 cm to 0.5 cm subcentimeter thyroid nodules on the left lobe with nonsuspicious features      Bone density on July 6, 2020 at [facility] imaging showed osteopenia with a low fracture risk the lowest T score was -1.5 for the lumbar spine FRAX scores were not significantly elevated      On top of this she has a history of a pituitary tumor resection in 2013 by Dr. Espinal.  She had a macroadenoma associated hemorrhage and she underwent transsphenoidal resection with no evidence of neurologic deficits and anterior pituitary hormone deficits post surgery.   We do not have any recent cortisol levels on hand free T4 levels were not measured.  Her last pituitary MRI in  2018 showed no evidence of tumor recurrence        She reports asymptomatic elevation of the serum and calcium and parathormone.  She denies depression, polydypsia , polyuria, abdominal pain, constipation, weakness, memory difficulties and osteoporosis.  She is not currently on hydrochlorothiazide.  She is not on lithium therapy.  She  is not taking calcium supplements.  She denies history of malignancy.  She denies family history of hypercalcemia.  She denies family history of endocrine malignancies.        Patient's medications, allergies, and social histories were reviewed and updated as appropriate.      ROS:      CONS:     No fever, no chills, no weight loss, no fatigue   EYES:      No diplopia, no blurry vision, no redness of eyes, no swelling of eyelids   ENT:    No hearing loss, No ear pain, No sore throat, no dysphagia, no neck swelling   CV:     No chest pain, no palpitations, no claudication, no orthopnea, no PND   PULM:    No SOB, no cough, no hemoptysis, no wheezing    GI:   No nausea, no vomiting, no diarrhea, no constipation, no bloody stools   :  Passing urine well, no dysuria, no hematuria   ENDO:   No polyuria, no polydipsia, no heat intolerance, no cold intolerance   NEURO: No headaches, no dizziness, no convulsions, no tremors   MUSC:  No joint swellings, no arthralgias, no myalgias, no weakness   SKIN:   No rash, no ulcers, no dry skin   PSYCH:   No depression, no anxiety, no difficulty sleeping       Past Medical History:  Patient Active Problem List    Diagnosis Date Noted   • Alkaline phosphatase elevation 07/14/2020   • Hypercalcemia 07/14/2020   • Upper abdominal pain 02/24/2020   • Fatty liver 02/24/2020   • Calculus of gallbladder without cholecystitis without obstruction 02/24/2020   • Hypercholesteremia 02/24/2020   • Multiple thyroid nodules- 2 nodules on left <1 cm.  02/24/2020   • Primary hyperparathyroidism (HCC) 02/24/2020   • History of thyroid disorder- due to pituitary tumor,  off medication therapy.  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 at SURGERY [facility]   • OTHER      breast reduction   • SUPRACERVICAL HYSTERECTOMY SCOPE      has ovaries and cervix remaining        Allergies:  [place] flavor; Morphine; Red wine complex; and Seasonal     Current Medications:    Current Outpatient Medications:   •  Omega-3 Fatty Acids (FISH OIL) 1000 MG Cap capsule, Take 1,000 mg by mouth 3 times a day, with meals., Disp: , Rfl:   •  Multiple Vitamins-Minerals (MULTIVITAMIN ADULT PO), Take  by mouth., Disp: , Rfl:   •  loratadine (CLARITIN) 10 MG TABS, Take 10 mg by mouth every day., Disp: , Rfl:     Social History:  Social History     Socioeconomic History   • Marital status:      Spouse name: Not on file   • Number of children: Not on file   • Years of education: Not on file   • Highest education level: Not on file   Occupational History   • Occupation:    Social Needs   • Financial resource strain: Not on file   • Food insecurity     Worry: Not on file     Inability: Not on file   • Transportation needs     Medical: Not on file     Non-medical: Not on file   Tobacco Use   • Smoking status: Never Smoker   • Smokeless tobacco: Never Used   Substance and Sexual Activity   • Alcohol use: Yes     Frequency: 2-4 times a month     Drinks per session: 1 or 2     Binge frequency: Never     Comment: very occasionally beer and wine    • Drug use: No   • Sexual activity: Yes     Partners: Male   Lifestyle   • Physical activity     Days per week: Not on  "file     Minutes per session: Not on file   • Stress: Not on file   Relationships   • Social connections     Talks on phone: Not on file     Gets together: Not on file     Attends Islam service: Not on file     Active member of club or organization: Not on file     Attends meetings of clubs or organizations: Not on file     Relationship status: Not on file   • Intimate partner violence     Fear of current or ex partner: Not on file     Emotionally abused: Not on file     Physically abused: Not on file     Forced sexual activity: Not on file   Other Topics Concern   • Not on file   Social History Narrative    , 2 children.         Family History:   Family History   Problem Relation Age of Onset   • Autoimmune Disease Mother         raynaud's and a few others   • GI Disease Mother         mother had possible biliary cirrhosis   • Lung Disease Maternal Grandmother    • Diabetes Paternal Grandmother    • Heart Disease Paternal Grandfather         multiple heart attacks, he smoked   • Cancer Neg Hx          PHYSICAL EXAM:   Vital signs: /82 (BP Location: Left arm, Patient Position: Sitting, BP Cuff Size: Large adult)   Pulse 95   Ht 1.702 m (5' 7\")   Wt 110.7 kg (244 lb 1.6 oz)   SpO2 88%   BMI 38.23 kg/m²   GENERAL: Obese female  in no apparent distress.   EYE: No ocular and eyelid asymmetry, Anicteric sclerae,  PERRL, No exophthalmos or lidlag  HENT: Hearing grossly intact, Normocephalic, atraumatic. Pink, moist mucous membranes, No exudate  NECK: Supple. Trachea midline. thyroid is normal in size without nodules or tenderness  CARDIOVASCULAR: Regular rate and rhythm. No murmurs, rubs, or gallops.   LUNGS: Clear to auscultation bilaterally   ABDOMEN: Soft, nontender with positive bowel sounds.   EXTREMITIES: No clubbing, cyanosis, or edema.   NEUROLOGICAL: Cranial nerves II-XII are grossly intact   Symmetric reflexes at the patella no proximal muscle weakness, No visible tremor with both " outstretched hands  LYMPH: No cervical, supraclavicular,  adenopathy palpated.   SKIN: No rashes, lesions. Turgor is normal.      Labs:  Lab Results   Component Value Date/Time    WBC 6.8 11/12/2018 09:28 AM    RBC 5.34 11/12/2018 09:28 AM    HEMOGLOBIN 14.6 11/12/2018 09:28 AM    MCV 86.7 11/12/2018 09:28 AM    MCH 27.3 11/12/2018 09:28 AM    MCHC 31.5 (L) 11/12/2018 09:28 AM    RDW 40.1 11/12/2018 09:28 AM    MPV 10.5 11/12/2018 09:28 AM       Lab Results   Component Value Date/Time    SODIUM 140 05/08/2020 08:07 AM    POTASSIUM 4.5 05/08/2020 08:07 AM    CHLORIDE 103 05/08/2020 08:07 AM    CO2 23 05/08/2020 08:07 AM    ANION 14.0 05/08/2020 08:07 AM    GLUCOSE 84 05/08/2020 08:07 AM    BUN 15 05/08/2020 08:07 AM    CREATININE 0.78 05/08/2020 08:07 AM    CALCIUM 10.4 05/08/2020 08:07 AM    ASTSGOT 19 05/08/2020 08:07 AM    ALTSGPT 25 05/08/2020 08:07 AM    TBILIRUBIN 0.3 05/08/2020 08:07 AM    ALBUMIN 4.4 05/08/2020 08:07 AM    TOTPROTEIN 7.5 05/08/2020 08:07 AM    GLOBULIN 3.1 05/08/2020 08:07 AM    AGRATIO 1.4 05/08/2020 08:07 AM       Lab Results   Component Value Date/Time    TSHULTRASEN 2.900 05/08/2020 0807     No results found for: FREEDIR  No results found for: FREET3  No results found for: THYSTIMIG      Imaging: See outside facility abdominal ultrasound, thyroid ultrasound and bone density which was discussed and reviewed with the patient      ASSESSMENT/PLAN:     1. Hypercalcemia  Mild hypercalcemia noted  (calcium levels are high normal with inappropriately nonsuppressed PTH levels)  Her dihydroxy vitamin D levels are elevated because of increased activation of 25-hydroxy vitamin D due to  increased 1 alpha hydroxylase activity from hyperparathyroidism    Recommended we obtain a sestamibi scan  Recommend adequate hydration  Technically if she does have primary hyperparathyroidism she meets criteria for surgery because of her young age  Explained the patient that this is not emergent  Explained to  patient that we have competent surgeons in [place]      Because she has a history of a pituitary tumor and probable her primary hyperparathyroidism she may have multiple endocrine neoplasia type I  I am going to schedule her for gene testing  We will plan for follow-up in a few weeks to go over the results of her expanded work-up      2. Primary hyperparathyroidism (HCC)  This is the probable etiology of her hypercalcemia   I do not think that this fully explains her alk phos elevation because there is a GI explanation for her alk phos elevation    3. History of pituitary adenoma  Previous history of pituitary resection  Recommend that we check a morning ACTH cortisol and free T4 level, prolactin and IGF-1  to make sure that she does not have hypopituitarism    4. Multiple thyroid nodules  Stable  Subcentimeter low risk thyroid nodules seen on left lobe  I am checking a calcitonin level to make sure that she does not have medullary thyroid carcinoma  Recommend observation and repeating ultrasound in 12 months if labs are normal    5. Alkaline phosphatase elevation  Unstable  Alkaline phosphatase elevation is not necessarily completely from bone disorder because she has a   gallstone  Most likely the alk phos elevation is still from her GI issue      Return in about 4 weeks (around 8/11/2020).       Thank you kindly for allowing me to participate in the endocrine care plan for this patient.    Travis Cope MD, [facility], [facility]  07/14/20    CC:   Mirta Day M.D.

## 2020-07-29 ENCOUNTER — HOSPITAL ENCOUNTER (OUTPATIENT)
Dept: RADIOLOGY | Facility: MEDICAL CENTER | Age: 45
End: 2020-07-29
Attending: INTERNAL MEDICINE
Payer: COMMERCIAL

## 2020-07-29 ENCOUNTER — HOSPITAL ENCOUNTER (OUTPATIENT)
Dept: RADIOLOGY | Facility: MEDICAL CENTER | Age: 45
End: 2020-07-29
Attending: NEUROLOGICAL SURGERY
Payer: COMMERCIAL

## 2020-07-29 DIAGNOSIS — E83.52 HYPERCALCEMIA: ICD-10-CM

## 2020-07-29 DIAGNOSIS — E21.0 PRIMARY HYPERPARATHYROIDISM (HCC): ICD-10-CM

## 2020-07-29 DIAGNOSIS — D35.2 BENIGN NEOPLASM OF PITUITARY GLAND AND CRANIOPHARYNGEAL DUCT (POUCH) (HCC): ICD-10-CM

## 2020-07-29 DIAGNOSIS — D35.3 BENIGN NEOPLASM OF PITUITARY GLAND AND CRANIOPHARYNGEAL DUCT (POUCH) (HCC): ICD-10-CM

## 2020-07-29 PROCEDURE — 700117 HCHG RX CONTRAST REV CODE 255: Performed by: NEUROLOGICAL SURGERY

## 2020-07-29 PROCEDURE — A9500 TC99M SESTAMIBI: HCPCS

## 2020-07-29 PROCEDURE — 70553 MRI BRAIN STEM W/O & W/DYE: CPT

## 2020-07-29 PROCEDURE — A9576 INJ PROHANCE MULTIPACK: HCPCS | Performed by: NEUROLOGICAL SURGERY

## 2020-07-29 RX ADMIN — GADOTERIDOL 20 ML: 279.3 INJECTION, SOLUTION INTRAVENOUS at 12:35

## 2020-11-13 ENCOUNTER — TELEMEDICINE (OUTPATIENT)
Dept: MEDICAL GROUP | Facility: IMAGING CENTER | Age: 45
End: 2020-11-13
Payer: COMMERCIAL

## 2020-11-13 VITALS — WEIGHT: 230 LBS | HEART RATE: 74 BPM | HEIGHT: 67 IN | BODY MASS INDEX: 36.1 KG/M2 | TEMPERATURE: 97.9 F

## 2020-11-13 DIAGNOSIS — R93.5 ABNORMAL ABDOMINAL ULTRASOUND: ICD-10-CM

## 2020-11-13 DIAGNOSIS — R74.8 ELEVATED ALKALINE PHOSPHATASE LEVEL: ICD-10-CM

## 2020-11-13 DIAGNOSIS — E55.9 VITAMIN D DEFICIENCY: ICD-10-CM

## 2020-11-13 DIAGNOSIS — E21.3 HYPERPARATHYROIDISM (HCC): ICD-10-CM

## 2020-11-13 DIAGNOSIS — K80.20 CALCULUS OF GALLBLADDER WITHOUT CHOLECYSTITIS WITHOUT OBSTRUCTION: ICD-10-CM

## 2020-11-13 DIAGNOSIS — E04.2 MULTIPLE THYROID NODULES: ICD-10-CM

## 2020-11-13 DIAGNOSIS — K76.0 FATTY LIVER: ICD-10-CM

## 2020-11-13 DIAGNOSIS — M85.80 OSTEOPENIA, UNSPECIFIED LOCATION: ICD-10-CM

## 2020-11-13 DIAGNOSIS — Z86.018 HISTORY OF PITUITARY ADENOMA: ICD-10-CM

## 2020-11-13 PROCEDURE — 99214 OFFICE O/P EST MOD 30 MIN: CPT | Mod: 95,CR | Performed by: FAMILY MEDICINE

## 2020-11-13 ASSESSMENT — PAIN SCALES - GENERAL: PAINLEVEL: NO PAIN

## 2020-11-13 NOTE — PROGRESS NOTES
Telemedicine Visit: Established Patient     This encounter was conducted via Zoom .   Verbal consent was obtained. Patient's identity was verified.    Subjective:     Chief Complaint   Patient presents with   • Hyperparathyroidism     f/u       45 y.o. female with history of pituitary adenoma s/p resection, hypercholesterolemia, fatty liver and hyperparathyroidism for follow up of elevated PTH.  Elevated PTH with upper end of normal calcium levels previously.   She was initially noted to have significantly low vitamin D levels at 9 in 4/2019. Otherwise without significant symptoms. She had some improvement on vitamin D high dose supplements initially, PTH was improving, then seemed to increase again.   She has been off vitamin D at this time since her last prescription.  Referred to endocrinology, had further evaluation with labs, dexa, parathyroid scan and MR-brain pituitary. Has not followed up as she was previously trying to get in with another endocrinologist previously, and would like to do do at this time.   Neck ultrasound-1/2020- left side 2 small nodules of thyroid.   Dexa 7/2020-osteopenia.  Parathyroid scan 7/2020-negative.   MR brain pituitary 7/2020  IMPRESSION:  1.  A stable mildly enhancing soft tissue along the floor of the sella likely representing residual pituitary macroadenoma. There has been no significant interval change.  2.  A few punctate nonspecific supratentorial T2 hyperintensities likely representing nonspecific foci of gliosis.  RUQ u/s 1/2020- gallstone and likely fatty liver.   Elevated alkaline phosphatase on prior labs- suspected due to GI etiology as above.  3/2019- GGT normal.       ROS:   Denies any recent fevers or chills. No nausea or vomiting. No chest pains or shortness of breath.     Allergies   Allergen Reactions   • Calloway Flavor    • Morphine Vomiting   • Red Wine Complex    • Seasonal        Current medicines (including changes today)  Current Outpatient Medications    Medication Sig Dispense Refill   • Omega-3 Fatty Acids (FISH OIL) 1000 MG Cap capsule Take 1,000 mg by mouth 3 times a day, with meals.     • Multiple Vitamins-Minerals (MULTIVITAMIN ADULT PO) Take  by mouth.     • loratadine (CLARITIN) 10 MG TABS Take 10 mg by mouth every day.       No current facility-administered medications for this visit.        Patient Active Problem List    Diagnosis Date Noted   • Alkaline phosphatase elevation 07/14/2020   • Hypercalcemia 07/14/2020   • Upper abdominal pain 02/24/2020   • Fatty liver 02/24/2020   • Calculus of gallbladder without cholecystitis without obstruction 02/24/2020   • Hypercholesteremia 02/24/2020   • Multiple thyroid nodules- 2 nodules on left <1 cm.  02/24/2020   • Primary hyperparathyroidism (HCC) 02/24/2020   • History of thyroid disorder- due to pituitary tumor, off medication therapy.  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   • S/P craniotomy 05/17/2013       Family History   Problem Relation Age of Onset   • Autoimmune Disease Mother         raynaud's and a few others   • GI Disease Mother         mother had possible biliary cirrhosis   • Lung Disease Maternal Grandmother    • Diabetes Paternal Grandmother    • Heart Disease Paternal Grandfather         multiple heart attacks, he smoked   • Cancer Neg Hx        Patient has a past medical history of Anemia (2008), ASTHMA, Indigestion, Migraine, 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  Patient  has a past surgical history that includes other; transsphenoidal resection (5/17/2013); and supracervical hysterectomy scope.       Objective:   Vitals obtained  "by patient:  Pulse 74 Comment: smart watch reading  Temp 36.6 °C (97.9 °F)   Ht 1.702 m (5' 7\")   Wt 104.3 kg (230 lb)   BMI 36.02 kg/m²     Physical Exam:  Constitutional: Alert, no distress, well-groomed.  Skin: No rashes in visible areas.  Eye: Round. Conjunctiva clear, lids normal. No icterus.   ENMT: Lips pink without lesions, good dentition, moist mucous membranes. Phonation normal.  Neck: No masses, no thyromegaly. Moves freely without pain.  CV: Pulse as reported by patient  Respiratory: Unlabored respiratory effort, no cough or audible wheeze  Psych: Alert and oriented x3, normal affect and mood.       Assessment and Plan:   The following treatment plan was discussed:     45 y.o. female with history of pituitary adenoma s/p resection, hypercholesterolemia, fatty liver and hyperparathyroidism.    1. Hyperparathyroidism (HCC)  PTH INTACT W/CA    REFERRAL TO ENDOCRINOLOGY   2. Vitamin D deficiency  VITAMIN D,25 HYDROXY    REFERRAL TO ENDOCRINOLOGY    vitamin D, Ergocalciferol, (DRISDOL) 1.25 MG (75509 UT) Cap capsule    VITAMIN D,25 HYDROXY   3. History of pituitary adenoma  REFERRAL TO ENDOCRINOLOGY   4. Multiple thyroid nodules- 2 nodules on left <1 cm.   REFERRAL TO ENDOCRINOLOGY   5. Osteopenia, unspecified location     6. Elevated alkaline phosphatase level  ALKALINE PHOSPHATASE ISOENZYMES    REFERRAL TO ENDOCRINOLOGY   7. Abnormal abdominal ultrasound      1/2020-likely fatty liver, gallstone   8. Calculus of gallbladder without cholecystitis without obstruction     9. Fatty liver     -Hyperparathyroidism-previously with slight improvement with vitamin D supplements.  Has had evaluation as above.  Had ultrasound and parathyroid scan without clear findings.  Refer to endocrinologist of patient's request. We will continue to monitor labs at this time.  -Vitamin D deficiency-we will obtain baseline labs at this time and start on vitamin D supplements as last levels were low and patient is currently not " on supplement therapy.  We will plan to repeat labs in 6 to 12 weeks.  -History of pituitary adenoma-had recent brain pituitary MR. Appears overall stable.  -Thyroid nodules on left less than 1 cm-1/2020 imaging.  Monitor.  -Osteopenia-DEXA 7/2020.  Adjust vitamin D level supplement.  Routine weightbearing exercise, monitor in future.  -Elevated alkaline phosphatase levels-slight increased noted on last labs. Stable without significant symptoms.   Prior ultrasound notes fatty liver and gallstones stable. We will continue to monitor at this time.    Follow-up: Return in about 3 months (around 2/13/2021).   Follow up sooner as needed.            This medical record contains text that has been entered with the assistance of computer voice recognition and dictation software.  Therefore, it may contain unintended errors in text, spelling, punctuation, or grammar.

## 2020-11-16 RX ORDER — ERGOCALCIFEROL 1.25 MG/1
50000 CAPSULE ORAL
Qty: 12 CAP | Refills: 0 | Status: SHIPPED
Start: 2020-11-16 | End: 2021-10-27

## 2020-11-25 ENCOUNTER — HOSPITAL ENCOUNTER (OUTPATIENT)
Dept: LAB | Facility: MEDICAL CENTER | Age: 45
End: 2020-11-25
Attending: INTERNAL MEDICINE
Payer: COMMERCIAL

## 2020-11-25 ENCOUNTER — HOSPITAL ENCOUNTER (OUTPATIENT)
Dept: LAB | Facility: MEDICAL CENTER | Age: 45
End: 2020-11-25
Attending: FAMILY MEDICINE
Payer: COMMERCIAL

## 2020-11-25 DIAGNOSIS — E04.2 MULTIPLE THYROID NODULES: ICD-10-CM

## 2020-11-25 DIAGNOSIS — R74.8 ELEVATED ALKALINE PHOSPHATASE LEVEL: ICD-10-CM

## 2020-11-25 DIAGNOSIS — Z86.018 HISTORY OF PITUITARY ADENOMA: ICD-10-CM

## 2020-11-25 DIAGNOSIS — E83.52 HYPERCALCEMIA: ICD-10-CM

## 2020-11-25 DIAGNOSIS — E55.9 VITAMIN D DEFICIENCY: ICD-10-CM

## 2020-11-25 DIAGNOSIS — E21.0 PRIMARY HYPERPARATHYROIDISM (HCC): ICD-10-CM

## 2020-11-25 LAB
25(OH)D3 SERPL-MCNC: 25 NG/ML (ref 30–100)
CALCIUM SERPL-MCNC: 10.6 MG/DL (ref 8.5–10.5)
PTH-INTACT SERPL-MCNC: 91.4 PG/ML (ref 14–72)

## 2020-11-25 PROCEDURE — 84075 ASSAY ALKALINE PHOSPHATASE: CPT

## 2020-11-25 PROCEDURE — 84080 ASSAY ALKALINE PHOSPHATASES: CPT

## 2020-11-25 PROCEDURE — 36415 COLL VENOUS BLD VENIPUNCTURE: CPT

## 2020-11-25 PROCEDURE — 82306 VITAMIN D 25 HYDROXY: CPT

## 2020-11-25 PROCEDURE — 82308 ASSAY OF CALCITONIN: CPT

## 2020-11-25 PROCEDURE — 83970 ASSAY OF PARATHORMONE: CPT

## 2020-11-29 LAB
ALP BONE SERPL-CCNC: 49 U/L (ref 0–55)
ALP ISOS SERPL HS-CCNC: 0 U/L
ALP LIVER SERPL-CCNC: 81 U/L (ref 0–94)
ALP SERPL-CCNC: 130 U/L (ref 40–120)
CALCIT SERPL-MCNC: <2 PG/ML (ref 0–5.1)

## 2020-12-01 DIAGNOSIS — E83.52 HYPERCALCEMIA: ICD-10-CM

## 2020-12-01 DIAGNOSIS — E21.3 HYPERPARATHYROIDISM (HCC): ICD-10-CM

## 2020-12-01 DIAGNOSIS — E55.9 VITAMIN D DEFICIENCY: ICD-10-CM

## 2021-01-21 LAB — TEST NAME 95000: ABNORMAL

## 2021-10-13 ENCOUNTER — HOSPITAL ENCOUNTER (OUTPATIENT)
Dept: LAB | Facility: MEDICAL CENTER | Age: 46
End: 2021-10-13
Attending: FAMILY MEDICINE
Payer: COMMERCIAL

## 2021-10-13 DIAGNOSIS — M85.80 OSTEOPENIA, UNSPECIFIED LOCATION: ICD-10-CM

## 2021-10-13 DIAGNOSIS — E21.3 HYPERPARATHYROIDISM (HCC): ICD-10-CM

## 2021-10-13 DIAGNOSIS — E55.9 VITAMIN D DEFICIENCY: ICD-10-CM

## 2021-10-13 DIAGNOSIS — Z13.0 SCREENING FOR DEFICIENCY ANEMIA: ICD-10-CM

## 2021-10-13 DIAGNOSIS — E83.52 HYPERCALCEMIA: ICD-10-CM

## 2021-10-13 DIAGNOSIS — Z13.220 SCREENING, LIPID: ICD-10-CM

## 2021-10-13 DIAGNOSIS — R74.8 ELEVATED ALKALINE PHOSPHATASE LEVEL: ICD-10-CM

## 2021-10-13 LAB
ALBUMIN SERPL BCP-MCNC: 4.7 G/DL (ref 3.2–4.9)
ALBUMIN/GLOB SERPL: 2 G/DL
ALP SERPL-CCNC: 139 U/L (ref 30–99)
ALT SERPL-CCNC: 19 U/L (ref 2–50)
ANION GAP SERPL CALC-SCNC: 11 MMOL/L (ref 7–16)
AST SERPL-CCNC: 14 U/L (ref 12–45)
BASOPHILS # BLD AUTO: 1.1 % (ref 0–1.8)
BASOPHILS # BLD: 0.07 K/UL (ref 0–0.12)
BILIRUB SERPL-MCNC: 0.3 MG/DL (ref 0.1–1.5)
BUN SERPL-MCNC: 16 MG/DL (ref 8–22)
CALCIUM SERPL-MCNC: 10.1 MG/DL (ref 8.5–10.5)
CHLORIDE SERPL-SCNC: 105 MMOL/L (ref 96–112)
CHOLEST SERPL-MCNC: 257 MG/DL (ref 100–199)
CO2 SERPL-SCNC: 24 MMOL/L (ref 20–33)
CREAT SERPL-MCNC: 0.7 MG/DL (ref 0.5–1.4)
EOSINOPHIL # BLD AUTO: 0.18 K/UL (ref 0–0.51)
EOSINOPHIL NFR BLD: 2.7 % (ref 0–6.9)
ERYTHROCYTE [DISTWIDTH] IN BLOOD BY AUTOMATED COUNT: 40.9 FL (ref 35.9–50)
FASTING STATUS PATIENT QL REPORTED: NORMAL
GLOBULIN SER CALC-MCNC: 2.3 G/DL (ref 1.9–3.5)
GLUCOSE SERPL-MCNC: 88 MG/DL (ref 65–99)
HCT VFR BLD AUTO: 47.6 % (ref 37–47)
HDLC SERPL-MCNC: 48 MG/DL
HGB BLD-MCNC: 15.3 G/DL (ref 12–16)
IMM GRANULOCYTES # BLD AUTO: 0.03 K/UL (ref 0–0.11)
IMM GRANULOCYTES NFR BLD AUTO: 0.5 % (ref 0–0.9)
LDLC SERPL CALC-MCNC: 175 MG/DL
LYMPHOCYTES # BLD AUTO: 1.78 K/UL (ref 1–4.8)
LYMPHOCYTES NFR BLD: 27.1 % (ref 22–41)
MCH RBC QN AUTO: 28.2 PG (ref 27–33)
MCHC RBC AUTO-ENTMCNC: 32.1 G/DL (ref 33.6–35)
MCV RBC AUTO: 87.8 FL (ref 81.4–97.8)
MONOCYTES # BLD AUTO: 0.38 K/UL (ref 0–0.85)
MONOCYTES NFR BLD AUTO: 5.8 % (ref 0–13.4)
NEUTROPHILS # BLD AUTO: 4.13 K/UL (ref 2–7.15)
NEUTROPHILS NFR BLD: 62.8 % (ref 44–72)
NRBC # BLD AUTO: 0 K/UL
NRBC BLD-RTO: 0 /100 WBC
PLATELET # BLD AUTO: 390 K/UL (ref 164–446)
PMV BLD AUTO: 10.3 FL (ref 9–12.9)
POTASSIUM SERPL-SCNC: 4.6 MMOL/L (ref 3.6–5.5)
PROT SERPL-MCNC: 7 G/DL (ref 6–8.2)
RBC # BLD AUTO: 5.42 M/UL (ref 4.2–5.4)
SODIUM SERPL-SCNC: 140 MMOL/L (ref 135–145)
TRIGL SERPL-MCNC: 172 MG/DL (ref 0–149)
WBC # BLD AUTO: 6.6 K/UL (ref 4.8–10.8)

## 2021-10-13 PROCEDURE — 80053 COMPREHEN METABOLIC PANEL: CPT

## 2021-10-13 PROCEDURE — 82306 VITAMIN D 25 HYDROXY: CPT

## 2021-10-13 PROCEDURE — 83970 ASSAY OF PARATHORMONE: CPT

## 2021-10-13 PROCEDURE — 85025 COMPLETE CBC W/AUTO DIFF WBC: CPT

## 2021-10-13 PROCEDURE — 36415 COLL VENOUS BLD VENIPUNCTURE: CPT

## 2021-10-13 PROCEDURE — 84443 ASSAY THYROID STIM HORMONE: CPT

## 2021-10-13 PROCEDURE — 80061 LIPID PANEL: CPT

## 2021-10-14 LAB
25(OH)D3 SERPL-MCNC: 19 NG/ML (ref 30–100)
PTH-INTACT SERPL-MCNC: 91.3 PG/ML (ref 14–72)
TSH SERPL DL<=0.005 MIU/L-ACNC: 2.61 UIU/ML (ref 0.38–5.33)

## 2021-10-16 ENCOUNTER — HOSPITAL ENCOUNTER (OUTPATIENT)
Dept: LAB | Facility: MEDICAL CENTER | Age: 46
End: 2021-10-16
Attending: INTERNAL MEDICINE
Payer: COMMERCIAL

## 2021-10-16 LAB
CORTIS SERPL-MCNC: 5.3 UG/DL (ref 0–23)
ESTRADIOL SERPL-MCNC: <5 PG/ML
FSH SERPL-ACNC: 6.1 MIU/ML
LH SERPL-ACNC: 2.1 IU/L
PHOSPHATE SERPL-MCNC: 2.8 MG/DL (ref 2.5–4.5)
PROLACTIN SERPL-MCNC: 8.18 NG/ML (ref 2.8–26)
T4 FREE SERPL-MCNC: 1.08 NG/DL (ref 0.93–1.7)
TSH SERPL DL<=0.005 MIU/L-ACNC: 1.77 UIU/ML (ref 0.38–5.33)

## 2021-10-16 PROCEDURE — 82670 ASSAY OF TOTAL ESTRADIOL: CPT

## 2021-10-16 PROCEDURE — 36415 COLL VENOUS BLD VENIPUNCTURE: CPT

## 2021-10-16 PROCEDURE — 84439 ASSAY OF FREE THYROXINE: CPT

## 2021-10-16 PROCEDURE — 82024 ASSAY OF ACTH: CPT

## 2021-10-16 PROCEDURE — 84146 ASSAY OF PROLACTIN: CPT

## 2021-10-16 PROCEDURE — 83002 ASSAY OF GONADOTROPIN (LH): CPT

## 2021-10-16 PROCEDURE — 84100 ASSAY OF PHOSPHORUS: CPT

## 2021-10-16 PROCEDURE — 84443 ASSAY THYROID STIM HORMONE: CPT

## 2021-10-16 PROCEDURE — 82533 TOTAL CORTISOL: CPT

## 2021-10-16 PROCEDURE — 83001 ASSAY OF GONADOTROPIN (FSH): CPT

## 2021-10-19 LAB — ACTH PLAS-MCNC: 8.4 PG/ML (ref 7.2–63.3)

## 2021-10-27 ENCOUNTER — OFFICE VISIT (OUTPATIENT)
Dept: MEDICAL GROUP | Facility: IMAGING CENTER | Age: 46
End: 2021-10-27
Payer: COMMERCIAL

## 2021-10-27 VITALS
WEIGHT: 239 LBS | HEART RATE: 74 BPM | BODY MASS INDEX: 37.51 KG/M2 | OXYGEN SATURATION: 97 % | HEIGHT: 67 IN | SYSTOLIC BLOOD PRESSURE: 132 MMHG | DIASTOLIC BLOOD PRESSURE: 94 MMHG | TEMPERATURE: 97.4 F

## 2021-10-27 DIAGNOSIS — E78.00 HYPERCHOLESTEREMIA: ICD-10-CM

## 2021-10-27 DIAGNOSIS — R79.89 INCREASED PTH LEVEL: ICD-10-CM

## 2021-10-27 DIAGNOSIS — R74.8 ALKALINE PHOSPHATASE ELEVATION: ICD-10-CM

## 2021-10-27 DIAGNOSIS — E04.2 MULTIPLE THYROID NODULES: ICD-10-CM

## 2021-10-27 DIAGNOSIS — R71.8 ELEVATED HEMATOCRIT: ICD-10-CM

## 2021-10-27 DIAGNOSIS — E55.9 VITAMIN D DEFICIENCY: ICD-10-CM

## 2021-10-27 PROCEDURE — 99214 OFFICE O/P EST MOD 30 MIN: CPT | Performed by: FAMILY MEDICINE

## 2021-10-27 RX ORDER — ERGOCALCIFEROL 1.25 MG/1
50000 CAPSULE ORAL
Qty: 12 CAPSULE | Refills: 0 | Status: SHIPPED | OUTPATIENT
Start: 2021-10-27

## 2021-10-27 ASSESSMENT — FIBROSIS 4 INDEX: FIB4 SCORE: 0.4

## 2021-10-27 NOTE — PROGRESS NOTES
SUBJECTIVE:    Chief Complaint   Patient presents with   • Lab Results   • Thyroid Problem       HPI:     Emiliana Her is a 46 y.o. female with history of pituitary adenoma s/p resection, hypercholesterolemia, and fatty liver for follow-up of lab work.    Hypercholesterolemia-does not desire medication therapy.  Has been working on her diet.    Elevated PTH-still elevated but has been stable compared to prior.    Vitamin D level is low- has not been on regular supplements currently.    Elevated hematocrit-noted on labs.  Mild.    Elevated alkaline phosphatase level.  Has reviewed with endocrinology about possible GI etiology.  She is being evaluated for possible MEN I.  Prior tests were inconclusive. Has been noted to have multiple thyroid nodules.   Does have further lab orders from endocrinology.  Sees endocrinology November 1.    ROS:  No recent fevers or chills. No nausea or vomiting. No diarrhea. No chest pains or shortness of breath. No lower extremity edema.    Current Outpatient Medications on File Prior to Visit   Medication Sig Dispense Refill   • Omega-3 Fatty Acids (FISH OIL) 1000 MG Cap capsule Take 1,000 mg by mouth 3 times a day, with meals.     • Multiple Vitamins-Minerals (MULTIVITAMIN ADULT PO) Take  by mouth.     • loratadine (CLARITIN) 10 MG TABS Take 10 mg by mouth every day.       No current facility-administered medications on file prior to visit.       Allergies   Allergen Reactions   • Calloway Flavor    • Morphine Vomiting   • Red Wine Complex    • Seasonal        Patient Active Problem List    Diagnosis Date Noted   • Alkaline phosphatase elevation 07/14/2020   • Hypercalcemia 07/14/2020   • Upper abdominal pain 02/24/2020   • Fatty liver 02/24/2020   • Calculus of gallbladder without cholecystitis without obstruction 02/24/2020   • Hypercholesteremia 02/24/2020   • Multiple thyroid nodules- 2 nodules on left <1 cm.  02/24/2020   • Primary hyperparathyroidism (HCC) 02/24/2020   •  "History of thyroid disorder- due to pituitary tumor, off medication therapy.  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   • Other specified disorder of intestines    • Seasonal allergies          OBJECTIVE:   /94   Pulse 74   Temp 36.3 °C (97.4 °F) (Temporal)   Ht 1.702 m (5' 7\")   Wt 108 kg (239 lb)   SpO2 97%   BMI 37.43 kg/m²   General: Well-developed well-nourished female, no acute distress  Neck: supple, no lymphadenopathy- cervical or supraclavicular, no thyromegaly  Cardiovascular: regular rate and rhythm, no murmurs, gallops, rubs  Lungs: clear to auscultation bilaterally, no wheezes, crackles, or rhonchi  Abdomen: +bowel sounds, soft, nontender, nondistended, no rebound, no guarding, no hepatosplenomegaly  Extremities: no cyanosis, clubbing, edema  Skin: Warm and dry  Psych: appropriate mood and affect     Ref. Range 10/13/2021 07:07 10/16/2021 08:37   WBC Latest Ref Range: 4.8 - 10.8 K/uL 6.6    RBC Latest Ref Range: 4.20 - 5.40 M/uL 5.42 (H)    Hemoglobin Latest Ref Range: 12.0 - 16.0 g/dL 15.3    Hematocrit Latest Ref Range: 37.0 - 47.0 % 47.6 (H)    MCV Latest Ref Range: 81.4 - 97.8 fL 87.8    MCH Latest Ref Range: 27.0 - 33.0 pg 28.2    MCHC Latest Ref Range: 33.6 - 35.0 g/dL 32.1 (L)    RDW Latest Ref Range: 35.9 - 50.0 fL 40.9    Platelet Count Latest Ref Range: 164 - 446 K/uL 390    MPV Latest Ref Range: 9.0 - 12.9 fL 10.3    Neutrophils-Polys Latest Ref " Range: 44.00 - 72.00 % 62.80    Neutrophils (Absolute) Latest Ref Range: 2.00 - 7.15 K/uL 4.13    Lymphocytes Latest Ref Range: 22.00 - 41.00 % 27.10    Lymphs (Absolute) Latest Ref Range: 1.00 - 4.80 K/uL 1.78    Monocytes Latest Ref Range: 0.00 - 13.40 % 5.80    Monos (Absolute) Latest Ref Range: 0.00 - 0.85 K/uL 0.38    Eosinophils Latest Ref Range: 0.00 - 6.90 % 2.70    Eos (Absolute) Latest Ref Range: 0.00 - 0.51 K/uL 0.18    Basophils Latest Ref Range: 0.00 - 1.80 % 1.10    Baso (Absolute) Latest Ref Range: 0.00 - 0.12 K/uL 0.07    Immature Granulocytes Latest Ref Range: 0.00 - 0.90 % 0.50    Immature Granulocytes (abs) Latest Ref Range: 0.00 - 0.11 K/uL 0.03    Nucleated RBC Latest Units: /100 WBC 0.00    NRBC (Absolute) Latest Units: K/uL 0.00    Sodium Latest Ref Range: 135 - 145 mmol/L 140    Potassium Latest Ref Range: 3.6 - 5.5 mmol/L 4.6    Chloride Latest Ref Range: 96 - 112 mmol/L 105    Co2 Latest Ref Range: 20 - 33 mmol/L 24    Anion Gap Latest Ref Range: 7.0 - 16.0  11.0    Glucose Latest Ref Range: 65 - 99 mg/dL 88    Bun Latest Ref Range: 8 - 22 mg/dL 16    Creatinine Latest Ref Range: 0.50 - 1.40 mg/dL 0.70    GFR If  Latest Ref Range: >60 mL/min/1.73 m 2 >60    GFR If Non  Latest Ref Range: >60 mL/min/1.73 m 2 >60    Calcium Latest Ref Range: 8.5 - 10.5 mg/dL 10.1    AST(SGOT) Latest Ref Range: 12 - 45 U/L 14    ALT(SGPT) Latest Ref Range: 2 - 50 U/L 19    Alkaline Phosphatase Latest Ref Range: 30 - 99 U/L 139 (H)    Total Bilirubin Latest Ref Range: 0.1 - 1.5 mg/dL 0.3    Albumin Latest Ref Range: 3.2 - 4.9 g/dL 4.7    Total Protein Latest Ref Range: 6.0 - 8.2 g/dL 7.0    Globulin Latest Ref Range: 1.9 - 3.5 g/dL 2.3    A-G Ratio Latest Units: g/dL 2.0    Cholesterol,Tot Latest Ref Range: 100 - 199 mg/dL 257 (H)    Triglycerides Latest Ref Range: 0 - 149 mg/dL 172 (H)    HDL Latest Ref Range: >=40 mg/dL 48    LDL Latest Ref Range: <100 mg/dL 175 (H)     25-Hydroxy   Vitamin D 25 Latest Ref Range: 30 - 100 ng/mL 19 (L)    TSH Latest Ref Range: 0.380 - 5.330 uIU/mL 2.610    Acth Latest Ref Range: 7.2 - 63.3 pg/mL  8.4   Pth, Intact Latest Ref Range: 14.0 - 72.0 pg/mL 91.3 (H)      ASSESSMENT/PLAN:    46 y.o.female with history of pituitary adenoma s/p resection, hypercholesterolemia, and fatty liver.    1. Hypercholesteremia     2. Increased PTH level  PTH WITH CALCIUM   3. Vitamin D deficiency  vitamin D2, Ergocalciferol, (DRISDOL) 1.25 MG (34019 UT) Cap capsule    VITAMIN D,25 HYDROXY   4. Elevated hematocrit  HEMOGLOBIN AND HEMATOCRIT   5. Alkaline phosphatase elevation     6. Multiple thyroid nodules- 2 nodules on left <1 cm.      -Hypercholesterolemia-patient will start on red yeast rice 600 mg twice daily. Recommend low-cholesterol, high-fiber diet and routine exercise.  Reviewed potential side effects of supplement therapy.  Repeat labs in 4 to 6 months, will need lab orders placed in future.   -Increase PTH levels-continues to be elevated but improved from prior higher levels.  Has not maintained adequate vitamin D levels consistently previously.  Thus will restart on vitamin D supplement and continue to monitor.  Patient is also being followed by endocrinology.  -Vitamin D deficiency-restart vitamin D supplement as above.  Jeffrey labs in 6 to 8 weeks.  -Elevated hematocrit-mild.    Will continue to monitor.  Hydrate adequately.  Consider further overnight oximetry evaluation.  -Alkaline phosphatase level elevation-currently undergoing further evaluation with endocrinology.   -Multiple thyroid nodules-continue to follow with endocrinology.    Follow up in 2-3 months.     This medical record contains text that has been entered with the assistance of computer voice recognition and dictation software.  Therefore, it may contain unintended errors in text, spelling, punctuation, or grammar.

## 2021-12-30 ENCOUNTER — HOSPITAL ENCOUNTER (OUTPATIENT)
Dept: LAB | Facility: MEDICAL CENTER | Age: 46
End: 2021-12-30
Attending: INTERNAL MEDICINE
Payer: COMMERCIAL

## 2021-12-30 LAB
ALBUMIN SERPL BCP-MCNC: 4.4 G/DL (ref 3.2–4.9)
ALBUMIN/GLOB SERPL: 1.6 G/DL
ALP SERPL-CCNC: 137 U/L (ref 30–99)
ALT SERPL-CCNC: 28 U/L (ref 2–50)
ANION GAP SERPL CALC-SCNC: 14 MMOL/L (ref 7–16)
AST SERPL-CCNC: 20 U/L (ref 12–45)
BILIRUB SERPL-MCNC: 0.3 MG/DL (ref 0.1–1.5)
BUN SERPL-MCNC: 13 MG/DL (ref 8–22)
CALCIUM SERPL-MCNC: 9.9 MG/DL (ref 8.5–10.5)
CHLORIDE SERPL-SCNC: 107 MMOL/L (ref 96–112)
CO2 SERPL-SCNC: 20 MMOL/L (ref 20–33)
CORTIS SERPL-MCNC: 12.7 UG/DL (ref 0–23)
CREAT SERPL-MCNC: 0.59 MG/DL (ref 0.5–1.4)
GLOBULIN SER CALC-MCNC: 2.7 G/DL (ref 1.9–3.5)
GLUCOSE SERPL-MCNC: 81 MG/DL (ref 65–99)
POTASSIUM SERPL-SCNC: 4.4 MMOL/L (ref 3.6–5.5)
PROT SERPL-MCNC: 7.1 G/DL (ref 6–8.2)
PTH-INTACT SERPL-MCNC: 83.3 PG/ML (ref 14–72)
SODIUM SERPL-SCNC: 141 MMOL/L (ref 135–145)

## 2021-12-30 PROCEDURE — 80053 COMPREHEN METABOLIC PANEL: CPT

## 2021-12-30 PROCEDURE — 82533 TOTAL CORTISOL: CPT

## 2021-12-30 PROCEDURE — 36415 COLL VENOUS BLD VENIPUNCTURE: CPT

## 2021-12-30 PROCEDURE — 83970 ASSAY OF PARATHORMONE: CPT

## 2022-05-18 ENCOUNTER — HOSPITAL ENCOUNTER (OUTPATIENT)
Dept: LAB | Facility: MEDICAL CENTER | Age: 47
End: 2022-05-18
Attending: INTERNAL MEDICINE
Payer: COMMERCIAL

## 2022-05-18 LAB
ALBUMIN SERPL BCP-MCNC: 4.7 G/DL (ref 3.2–4.9)
ALBUMIN/GLOB SERPL: 2 G/DL
ALP SERPL-CCNC: 132 U/L (ref 30–99)
ALT SERPL-CCNC: 19 U/L (ref 2–50)
ANION GAP SERPL CALC-SCNC: 12 MMOL/L (ref 7–16)
AST SERPL-CCNC: 16 U/L (ref 12–45)
BILIRUB SERPL-MCNC: 0.3 MG/DL (ref 0.1–1.5)
BUN SERPL-MCNC: 16 MG/DL (ref 8–22)
CA-I SERPL-SCNC: 1.3 MMOL/L (ref 1.1–1.3)
CALCIUM SERPL-MCNC: 10 MG/DL (ref 8.5–10.5)
CHLORIDE SERPL-SCNC: 106 MMOL/L (ref 96–112)
CO2 SERPL-SCNC: 21 MMOL/L (ref 20–33)
CREAT SERPL-MCNC: 0.7 MG/DL (ref 0.5–1.4)
EST. AVERAGE GLUCOSE BLD GHB EST-MCNC: 108 MG/DL
ESTRADIOL SERPL-MCNC: <5 PG/ML
FSH SERPL-ACNC: 6.1 MIU/ML
GFR SERPLBLD CREATININE-BSD FMLA CKD-EPI: 106 ML/MIN/1.73 M 2
GLOBULIN SER CALC-MCNC: 2.4 G/DL (ref 1.9–3.5)
GLUCOSE SERPL-MCNC: 82 MG/DL (ref 65–99)
HBA1C MFR BLD: 5.4 % (ref 4–5.6)
LH SERPL-ACNC: 2.1 IU/L
POTASSIUM SERPL-SCNC: 4.2 MMOL/L (ref 3.6–5.5)
PROLACTIN SERPL-MCNC: 8.83 NG/ML (ref 2.8–26)
PROT SERPL-MCNC: 7.1 G/DL (ref 6–8.2)
PTH-INTACT SERPL-MCNC: 127 PG/ML (ref 14–72)
SODIUM SERPL-SCNC: 139 MMOL/L (ref 135–145)
T3 SERPL-MCNC: 143 NG/DL (ref 60–181)
T3FREE SERPL-MCNC: 3.45 PG/ML (ref 2–4.4)
T4 FREE SERPL-MCNC: 1.11 NG/DL (ref 0.93–1.7)
TSH SERPL DL<=0.005 MIU/L-ACNC: 1.98 UIU/ML (ref 0.38–5.33)
VIT B12 SERPL-MCNC: 421 PG/ML (ref 211–911)

## 2022-05-18 PROCEDURE — 84146 ASSAY OF PROLACTIN: CPT

## 2022-05-18 PROCEDURE — 82330 ASSAY OF CALCIUM: CPT

## 2022-05-18 PROCEDURE — 84305 ASSAY OF SOMATOMEDIN: CPT

## 2022-05-18 PROCEDURE — 82306 VITAMIN D 25 HYDROXY: CPT

## 2022-05-18 PROCEDURE — 83970 ASSAY OF PARATHORMONE: CPT

## 2022-05-18 PROCEDURE — 84439 ASSAY OF FREE THYROXINE: CPT

## 2022-05-18 PROCEDURE — 80053 COMPREHEN METABOLIC PANEL: CPT

## 2022-05-18 PROCEDURE — 84443 ASSAY THYROID STIM HORMONE: CPT

## 2022-05-18 PROCEDURE — 83036 HEMOGLOBIN GLYCOSYLATED A1C: CPT

## 2022-05-18 PROCEDURE — 83001 ASSAY OF GONADOTROPIN (FSH): CPT

## 2022-05-18 PROCEDURE — 84480 ASSAY TRIIODOTHYRONINE (T3): CPT

## 2022-05-18 PROCEDURE — 83002 ASSAY OF GONADOTROPIN (LH): CPT

## 2022-05-18 PROCEDURE — 82607 VITAMIN B-12: CPT

## 2022-05-18 PROCEDURE — 84481 FREE ASSAY (FT-3): CPT

## 2022-05-18 PROCEDURE — 82670 ASSAY OF TOTAL ESTRADIOL: CPT

## 2022-05-21 LAB
25(OH)D3 SERPL-MCNC: 16 NG/ML (ref 30–80)
IGF-I SERPL-MCNC: 71 NG/ML (ref 59–238)
IGF-I Z-SCORE SERPL: -1.6

## 2023-08-24 ENCOUNTER — PATIENT MESSAGE (OUTPATIENT)
Dept: HEALTH INFORMATION MANAGEMENT | Facility: OTHER | Age: 48
End: 2023-08-24

## 2023-11-20 ENCOUNTER — TELEPHONE (OUTPATIENT)
Dept: MEDICAL GROUP | Facility: IMAGING CENTER | Age: 48
End: 2023-11-20
Payer: COMMERCIAL

## 2023-11-20 NOTE — TELEPHONE ENCOUNTER
[facility] to reschedule patients appointment on 12.22.23 with Dr. Mirta Day. Dr. Day Will be out-of-office at that time. Patients was instructed to call [phone] or use the [employer] application to reschedule at their earliest convenience.

## 2023-12-22 ENCOUNTER — APPOINTMENT (OUTPATIENT)
Dept: MEDICAL GROUP | Facility: IMAGING CENTER | Age: 48
End: 2023-12-22
Payer: COMMERCIAL

## 2024-02-19 ENCOUNTER — HOSPITAL ENCOUNTER (OUTPATIENT)
Dept: LAB | Facility: MEDICAL CENTER | Age: 49
End: 2024-02-19
Attending: FAMILY MEDICINE
Payer: COMMERCIAL

## 2024-02-19 DIAGNOSIS — R79.89 INCREASED PTH LEVEL: ICD-10-CM

## 2024-02-19 DIAGNOSIS — E55.9 VITAMIN D DEFICIENCY: ICD-10-CM

## 2024-02-19 DIAGNOSIS — E04.2 MULTIPLE THYROID NODULES: ICD-10-CM

## 2024-02-19 DIAGNOSIS — E78.00 HYPERCHOLESTEREMIA: ICD-10-CM

## 2024-02-19 DIAGNOSIS — Z13.1 SCREENING FOR DIABETES MELLITUS: ICD-10-CM

## 2024-02-19 DIAGNOSIS — R71.8 ELEVATED HEMATOCRIT: ICD-10-CM

## 2024-02-19 LAB
25(OH)D3 SERPL-MCNC: 48 NG/ML (ref 30–100)
ALBUMIN SERPL BCP-MCNC: 4.6 G/DL (ref 3.2–4.9)
ALBUMIN/GLOB SERPL: 1.7 G/DL
ALP SERPL-CCNC: 132 U/L (ref 30–99)
ALT SERPL-CCNC: 22 U/L (ref 2–50)
ANION GAP SERPL CALC-SCNC: 12 MMOL/L (ref 7–16)
AST SERPL-CCNC: 16 U/L (ref 12–45)
BASOPHILS # BLD AUTO: 0.8 % (ref 0–1.8)
BASOPHILS # BLD: 0.06 K/UL (ref 0–0.12)
BILIRUB SERPL-MCNC: 0.4 MG/DL (ref 0.1–1.5)
BUN SERPL-MCNC: 10 MG/DL (ref 8–22)
CALCIUM ALBUM COR SERPL-MCNC: 9.7 MG/DL (ref 8.5–10.5)
CALCIUM SERPL-MCNC: 10.2 MG/DL (ref 8.4–10.2)
CHLORIDE SERPL-SCNC: 104 MMOL/L (ref 96–112)
CHOLEST SERPL-MCNC: 261 MG/DL (ref 100–199)
CO2 SERPL-SCNC: 25 MMOL/L (ref 20–33)
CREAT SERPL-MCNC: 0.72 MG/DL (ref 0.5–1.4)
EOSINOPHIL # BLD AUTO: 0.17 K/UL (ref 0–0.51)
EOSINOPHIL NFR BLD: 2.3 % (ref 0–6.9)
ERYTHROCYTE [DISTWIDTH] IN BLOOD BY AUTOMATED COUNT: 39.8 FL (ref 35.9–50)
EST. AVERAGE GLUCOSE BLD GHB EST-MCNC: 114 MG/DL
FASTING STATUS PATIENT QL REPORTED: NORMAL
GFR SERPLBLD CREATININE-BSD FMLA CKD-EPI: 101 ML/MIN/1.73 M 2
GLOBULIN SER CALC-MCNC: 2.7 G/DL (ref 1.9–3.5)
GLUCOSE SERPL-MCNC: 79 MG/DL (ref 65–99)
HBA1C MFR BLD: 5.6 % (ref 4–5.6)
HCT VFR BLD AUTO: 47.2 % (ref 37–47)
HDLC SERPL-MCNC: 53 MG/DL
HGB BLD-MCNC: 15.8 G/DL (ref 12–16)
IMM GRANULOCYTES # BLD AUTO: 0.03 K/UL (ref 0–0.11)
IMM GRANULOCYTES NFR BLD AUTO: 0.4 % (ref 0–0.9)
LDLC SERPL CALC-MCNC: 175 MG/DL
LYMPHOCYTES # BLD AUTO: 2.16 K/UL (ref 1–4.8)
LYMPHOCYTES NFR BLD: 29.1 % (ref 22–41)
MCH RBC QN AUTO: 28.2 PG (ref 27–33)
MCHC RBC AUTO-ENTMCNC: 33.5 G/DL (ref 32.2–35.5)
MCV RBC AUTO: 84.1 FL (ref 81.4–97.8)
MONOCYTES # BLD AUTO: 0.41 K/UL (ref 0–0.85)
MONOCYTES NFR BLD AUTO: 5.5 % (ref 0–13.4)
NEUTROPHILS # BLD AUTO: 4.6 K/UL (ref 1.82–7.42)
NEUTROPHILS NFR BLD: 61.9 % (ref 44–72)
NRBC # BLD AUTO: 0 K/UL
NRBC BLD-RTO: 0 /100 WBC (ref 0–0.2)
PLATELET # BLD AUTO: 340 K/UL (ref 164–446)
PMV BLD AUTO: 9.9 FL (ref 9–12.9)
POTASSIUM SERPL-SCNC: 4.4 MMOL/L (ref 3.6–5.5)
PROT SERPL-MCNC: 7.3 G/DL (ref 6–8.2)
PTH-INTACT SERPL-MCNC: 105 PG/ML (ref 14–72)
RBC # BLD AUTO: 5.61 M/UL (ref 4.2–5.4)
SODIUM SERPL-SCNC: 141 MMOL/L (ref 135–145)
T3 SERPL-MCNC: 124 NG/DL (ref 60–181)
T4 FREE SERPL-MCNC: 0.95 NG/DL (ref 0.93–1.7)
THYROPEROXIDASE AB SERPL-ACNC: <9 IU/ML (ref 0–9)
TRIGL SERPL-MCNC: 163 MG/DL (ref 0–149)
TSH SERPL DL<=0.005 MIU/L-ACNC: 1.82 UIU/ML (ref 0.38–5.33)
WBC # BLD AUTO: 7.4 K/UL (ref 4.8–10.8)

## 2024-02-19 PROCEDURE — 80061 LIPID PANEL: CPT

## 2024-02-19 PROCEDURE — 84480 ASSAY TRIIODOTHYRONINE (T3): CPT

## 2024-02-19 PROCEDURE — 84443 ASSAY THYROID STIM HORMONE: CPT

## 2024-02-19 PROCEDURE — 36415 COLL VENOUS BLD VENIPUNCTURE: CPT

## 2024-02-19 PROCEDURE — 80053 COMPREHEN METABOLIC PANEL: CPT

## 2024-02-19 PROCEDURE — 84439 ASSAY OF FREE THYROXINE: CPT

## 2024-02-19 PROCEDURE — 85025 COMPLETE CBC W/AUTO DIFF WBC: CPT

## 2024-02-19 PROCEDURE — 82306 VITAMIN D 25 HYDROXY: CPT

## 2024-02-19 PROCEDURE — 86376 MICROSOMAL ANTIBODY EACH: CPT

## 2024-02-19 PROCEDURE — 83036 HEMOGLOBIN GLYCOSYLATED A1C: CPT

## 2024-02-19 PROCEDURE — 83970 ASSAY OF PARATHORMONE: CPT

## 2024-02-19 PROCEDURE — 86800 THYROGLOBULIN ANTIBODY: CPT

## 2024-02-21 LAB — THYROGLOB AB SERPL-ACNC: <0.9 IU/ML (ref 0–4)

## 2024-04-18 ENCOUNTER — APPOINTMENT (OUTPATIENT)
Dept: MEDICAL GROUP | Facility: IMAGING CENTER | Age: 49
End: 2024-04-18
Payer: COMMERCIAL

## 2024-06-12 ENCOUNTER — APPOINTMENT (OUTPATIENT)
Dept: MEDICAL GROUP | Facility: IMAGING CENTER | Age: 49
End: 2024-06-12
Payer: COMMERCIAL

## 2024-08-21 ENCOUNTER — APPOINTMENT (OUTPATIENT)
Dept: MEDICAL GROUP | Facility: IMAGING CENTER | Age: 49
End: 2024-08-21
Payer: COMMERCIAL

## 2025-03-20 ENCOUNTER — HOSPITAL ENCOUNTER (OUTPATIENT)
Dept: RADIOLOGY | Facility: MEDICAL CENTER | Age: 50
End: 2025-03-20
Attending: FAMILY MEDICINE
Payer: COMMERCIAL

## 2025-03-20 DIAGNOSIS — Z12.31 VISIT FOR SCREENING MAMMOGRAM: ICD-10-CM

## 2025-03-20 PROCEDURE — 77067 SCR MAMMO BI INCL CAD: CPT

## 2025-03-26 ENCOUNTER — RESULTS FOLLOW-UP (OUTPATIENT)
Dept: MEDICAL GROUP | Facility: IMAGING CENTER | Age: 50
End: 2025-03-26

## 2025-11-05 ENCOUNTER — APPOINTMENT (OUTPATIENT)
Dept: MEDICAL GROUP | Facility: IMAGING CENTER | Age: 50
End: 2025-11-05
Payer: COMMERCIAL

## 2025-11-06 ENCOUNTER — APPOINTMENT (OUTPATIENT)
Dept: MEDICAL GROUP | Facility: MEDICAL CENTER | Age: 50
End: 2025-11-06
Payer: COMMERCIAL